# Patient Record
Sex: FEMALE | Race: WHITE | NOT HISPANIC OR LATINO | Employment: OTHER | URBAN - METROPOLITAN AREA
[De-identification: names, ages, dates, MRNs, and addresses within clinical notes are randomized per-mention and may not be internally consistent; named-entity substitution may affect disease eponyms.]

---

## 2019-03-28 ENCOUNTER — HOSPITAL ENCOUNTER (INPATIENT)
Facility: MEDICAL CENTER | Age: 84
LOS: 3 days | DRG: 872 | End: 2019-03-31
Attending: EMERGENCY MEDICINE | Admitting: HOSPITALIST
Payer: MEDICARE

## 2019-03-28 ENCOUNTER — APPOINTMENT (OUTPATIENT)
Dept: RADIOLOGY | Facility: MEDICAL CENTER | Age: 84
DRG: 872 | End: 2019-03-28
Attending: EMERGENCY MEDICINE
Payer: MEDICARE

## 2019-03-28 DIAGNOSIS — N39.0 ACUTE UTI: ICD-10-CM

## 2019-03-28 DIAGNOSIS — A41.9 SEPSIS, DUE TO UNSPECIFIED ORGANISM: ICD-10-CM

## 2019-03-28 PROBLEM — E03.9 HYPOTHYROIDISM: Status: ACTIVE | Noted: 2019-03-28

## 2019-03-28 PROBLEM — E87.1 HYPONATREMIA: Status: ACTIVE | Noted: 2019-03-28

## 2019-03-28 LAB
ALBUMIN SERPL BCP-MCNC: 4 G/DL (ref 3.2–4.9)
ALBUMIN/GLOB SERPL: 1.3 G/DL
ALP SERPL-CCNC: 74 U/L (ref 30–99)
ALT SERPL-CCNC: 21 U/L (ref 2–50)
ANION GAP SERPL CALC-SCNC: 11 MMOL/L (ref 0–11.9)
APPEARANCE UR: ABNORMAL
AST SERPL-CCNC: 30 U/L (ref 12–45)
BACTERIA #/AREA URNS HPF: ABNORMAL /HPF
BASOPHILS # BLD AUTO: 0.2 % (ref 0–1.8)
BASOPHILS # BLD: 0.03 K/UL (ref 0–0.12)
BILIRUB SERPL-MCNC: 1 MG/DL (ref 0.1–1.5)
BILIRUB UR QL STRIP.AUTO: NEGATIVE
BUN SERPL-MCNC: 12 MG/DL (ref 8–22)
CALCIUM SERPL-MCNC: 9 MG/DL (ref 8.5–10.5)
CHLORIDE SERPL-SCNC: 99 MMOL/L (ref 96–112)
CO2 SERPL-SCNC: 20 MMOL/L (ref 20–33)
COLOR UR: ABNORMAL
CREAT SERPL-MCNC: 0.68 MG/DL (ref 0.5–1.4)
EOSINOPHIL # BLD AUTO: 0 K/UL (ref 0–0.51)
EOSINOPHIL NFR BLD: 0 % (ref 0–6.9)
EPI CELLS #/AREA URNS HPF: NEGATIVE /HPF
ERYTHROCYTE [DISTWIDTH] IN BLOOD BY AUTOMATED COUNT: 48.6 FL (ref 35.9–50)
FLUAV RNA SPEC QL NAA+PROBE: NEGATIVE
FLUBV RNA SPEC QL NAA+PROBE: NEGATIVE
GLOBULIN SER CALC-MCNC: 3.2 G/DL (ref 1.9–3.5)
GLUCOSE SERPL-MCNC: 133 MG/DL (ref 65–99)
GLUCOSE UR STRIP.AUTO-MCNC: NEGATIVE MG/DL
HCT VFR BLD AUTO: 42.3 % (ref 37–47)
HGB BLD-MCNC: 14.2 G/DL (ref 12–16)
IMM GRANULOCYTES # BLD AUTO: 0.08 K/UL (ref 0–0.11)
IMM GRANULOCYTES NFR BLD AUTO: 0.5 % (ref 0–0.9)
KETONES UR STRIP.AUTO-MCNC: >=160 MG/DL
LACTATE BLD-SCNC: 1.3 MMOL/L (ref 0.5–2)
LEUKOCYTE ESTERASE UR QL STRIP.AUTO: ABNORMAL
LYMPHOCYTES # BLD AUTO: 0.64 K/UL (ref 1–4.8)
LYMPHOCYTES NFR BLD: 4.3 % (ref 22–41)
MCH RBC QN AUTO: 32.1 PG (ref 27–33)
MCHC RBC AUTO-ENTMCNC: 33.6 G/DL (ref 33.6–35)
MCV RBC AUTO: 95.7 FL (ref 81.4–97.8)
MICRO URNS: ABNORMAL
MONOCYTES # BLD AUTO: 1.37 K/UL (ref 0–0.85)
MONOCYTES NFR BLD AUTO: 9.3 % (ref 0–13.4)
NEUTROPHILS # BLD AUTO: 12.69 K/UL (ref 2–7.15)
NEUTROPHILS NFR BLD: 85.7 % (ref 44–72)
NITRITE UR QL STRIP.AUTO: NEGATIVE
NRBC # BLD AUTO: 0 K/UL
NRBC BLD-RTO: 0 /100 WBC
OSMOLALITY SERPL: 277 MOSM/KG H2O (ref 278–298)
OSMOLALITY UR: 730 MOSM/KG H2O (ref 300–900)
PH UR STRIP.AUTO: 5.5 [PH]
PLATELET # BLD AUTO: 246 K/UL (ref 164–446)
PMV BLD AUTO: 9.1 FL (ref 9–12.9)
POTASSIUM SERPL-SCNC: 3.7 MMOL/L (ref 3.6–5.5)
PROT SERPL-MCNC: 7.2 G/DL (ref 6–8.2)
PROT UR QL STRIP: 30 MG/DL
RBC # BLD AUTO: 4.42 M/UL (ref 4.2–5.4)
RBC # URNS HPF: ABNORMAL /HPF
RBC UR QL AUTO: ABNORMAL
SODIUM SERPL-SCNC: 130 MMOL/L (ref 135–145)
SP GR UR STRIP.AUTO: 1.02
UROBILINOGEN UR STRIP.AUTO-MCNC: 0.2 MG/DL
WBC # BLD AUTO: 14.8 K/UL (ref 4.8–10.8)
WBC #/AREA URNS HPF: ABNORMAL /HPF

## 2019-03-28 PROCEDURE — 83605 ASSAY OF LACTIC ACID: CPT

## 2019-03-28 PROCEDURE — 700101 HCHG RX REV CODE 250: Performed by: EMERGENCY MEDICINE

## 2019-03-28 PROCEDURE — 87040 BLOOD CULTURE FOR BACTERIA: CPT

## 2019-03-28 PROCEDURE — 71045 X-RAY EXAM CHEST 1 VIEW: CPT

## 2019-03-28 PROCEDURE — 700105 HCHG RX REV CODE 258: Performed by: HOSPITALIST

## 2019-03-28 PROCEDURE — 94640 AIRWAY INHALATION TREATMENT: CPT

## 2019-03-28 PROCEDURE — 87077 CULTURE AEROBIC IDENTIFY: CPT

## 2019-03-28 PROCEDURE — 87502 INFLUENZA DNA AMP PROBE: CPT

## 2019-03-28 PROCEDURE — 81001 URINALYSIS AUTO W/SCOPE: CPT

## 2019-03-28 PROCEDURE — 99285 EMERGENCY DEPT VISIT HI MDM: CPT

## 2019-03-28 PROCEDURE — 770006 HCHG ROOM/CARE - MED/SURG/GYN SEMI*

## 2019-03-28 PROCEDURE — 80053 COMPREHEN METABOLIC PANEL: CPT

## 2019-03-28 PROCEDURE — 87086 URINE CULTURE/COLONY COUNT: CPT

## 2019-03-28 PROCEDURE — 700102 HCHG RX REV CODE 250 W/ 637 OVERRIDE(OP): Performed by: EMERGENCY MEDICINE

## 2019-03-28 PROCEDURE — A9270 NON-COVERED ITEM OR SERVICE: HCPCS | Performed by: HOSPITALIST

## 2019-03-28 PROCEDURE — A9270 NON-COVERED ITEM OR SERVICE: HCPCS | Performed by: EMERGENCY MEDICINE

## 2019-03-28 PROCEDURE — 83935 ASSAY OF URINE OSMOLALITY: CPT

## 2019-03-28 PROCEDURE — 700105 HCHG RX REV CODE 258: Performed by: EMERGENCY MEDICINE

## 2019-03-28 PROCEDURE — 87186 SC STD MICRODIL/AGAR DIL: CPT

## 2019-03-28 PROCEDURE — 85025 COMPLETE CBC W/AUTO DIFF WBC: CPT

## 2019-03-28 PROCEDURE — 302214 INTUBATION BOX: Performed by: INTERNAL MEDICINE

## 2019-03-28 PROCEDURE — 700102 HCHG RX REV CODE 250 W/ 637 OVERRIDE(OP): Performed by: HOSPITALIST

## 2019-03-28 PROCEDURE — 700111 HCHG RX REV CODE 636 W/ 250 OVERRIDE (IP): Performed by: HOSPITALIST

## 2019-03-28 PROCEDURE — 94760 N-INVAS EAR/PLS OXIMETRY 1: CPT

## 2019-03-28 PROCEDURE — 96365 THER/PROPH/DIAG IV INF INIT: CPT

## 2019-03-28 PROCEDURE — 36415 COLL VENOUS BLD VENIPUNCTURE: CPT

## 2019-03-28 PROCEDURE — 83930 ASSAY OF BLOOD OSMOLALITY: CPT

## 2019-03-28 PROCEDURE — 99223 1ST HOSP IP/OBS HIGH 75: CPT | Mod: AI | Performed by: HOSPITALIST

## 2019-03-28 PROCEDURE — 700111 HCHG RX REV CODE 636 W/ 250 OVERRIDE (IP): Performed by: EMERGENCY MEDICINE

## 2019-03-28 RX ORDER — ONDANSETRON 4 MG/1
4 TABLET, ORALLY DISINTEGRATING ORAL EVERY 4 HOURS PRN
Status: DISCONTINUED | OUTPATIENT
Start: 2019-03-28 | End: 2019-03-31 | Stop reason: HOSPADM

## 2019-03-28 RX ORDER — BRIMONIDINE TARTRATE 1.5 MG/ML
1 SOLUTION/ DROPS OPHTHALMIC 2 TIMES DAILY
Status: DISCONTINUED | OUTPATIENT
Start: 2019-03-28 | End: 2019-03-31 | Stop reason: HOSPADM

## 2019-03-28 RX ORDER — ONDANSETRON 2 MG/ML
4 INJECTION INTRAMUSCULAR; INTRAVENOUS EVERY 4 HOURS PRN
Status: DISCONTINUED | OUTPATIENT
Start: 2019-03-28 | End: 2019-03-31 | Stop reason: HOSPADM

## 2019-03-28 RX ORDER — SODIUM CHLORIDE 9 MG/ML
2000 INJECTION, SOLUTION INTRAVENOUS ONCE
Status: COMPLETED | OUTPATIENT
Start: 2019-03-28 | End: 2019-03-28

## 2019-03-28 RX ORDER — ACETAMINOPHEN 325 MG/1
650 TABLET ORAL ONCE
Status: COMPLETED | OUTPATIENT
Start: 2019-03-28 | End: 2019-03-28

## 2019-03-28 RX ORDER — BRIMONIDINE TARTRATE 1 MG/ML
1 SOLUTION/ DROPS OPHTHALMIC 2 TIMES DAILY
COMMUNITY

## 2019-03-28 RX ORDER — LEVALBUTEROL TARTRATE 45 UG/1
1-2 AEROSOL, METERED ORAL EVERY 4 HOURS PRN
Status: ON HOLD | COMMUNITY
End: 2019-03-31

## 2019-03-28 RX ORDER — BISACODYL 10 MG
10 SUPPOSITORY, RECTAL RECTAL
Status: DISCONTINUED | OUTPATIENT
Start: 2019-03-28 | End: 2019-03-31 | Stop reason: HOSPADM

## 2019-03-28 RX ORDER — LEVOTHYROXINE SODIUM 0.07 MG/1
75 TABLET ORAL EVERY MORNING
COMMUNITY

## 2019-03-28 RX ORDER — AMOXICILLIN 250 MG
2 CAPSULE ORAL 2 TIMES DAILY
Status: DISCONTINUED | OUTPATIENT
Start: 2019-03-28 | End: 2019-03-31 | Stop reason: HOSPADM

## 2019-03-28 RX ORDER — ACETAMINOPHEN 325 MG/1
650 TABLET ORAL EVERY 6 HOURS PRN
Status: DISCONTINUED | OUTPATIENT
Start: 2019-03-28 | End: 2019-03-31 | Stop reason: HOSPADM

## 2019-03-28 RX ORDER — LEVOTHYROXINE SODIUM 0.07 MG/1
75 TABLET ORAL EVERY MORNING
Status: DISCONTINUED | OUTPATIENT
Start: 2019-03-29 | End: 2019-03-31 | Stop reason: HOSPADM

## 2019-03-28 RX ORDER — POLYETHYLENE GLYCOL 3350 17 G/17G
1 POWDER, FOR SOLUTION ORAL
Status: DISCONTINUED | OUTPATIENT
Start: 2019-03-28 | End: 2019-03-31 | Stop reason: HOSPADM

## 2019-03-28 RX ORDER — SODIUM CHLORIDE 9 MG/ML
1000 INJECTION, SOLUTION INTRAVENOUS CONTINUOUS
Status: DISCONTINUED | OUTPATIENT
Start: 2019-03-28 | End: 2019-03-28

## 2019-03-28 RX ORDER — SODIUM CHLORIDE 9 MG/ML
30 INJECTION, SOLUTION INTRAVENOUS
Status: COMPLETED | OUTPATIENT
Start: 2019-03-28 | End: 2019-03-28

## 2019-03-28 RX ORDER — SODIUM CHLORIDE 9 MG/ML
500 INJECTION, SOLUTION INTRAVENOUS
Status: DISCONTINUED | OUTPATIENT
Start: 2019-03-28 | End: 2019-03-31 | Stop reason: HOSPADM

## 2019-03-28 RX ORDER — ESTRADIOL 0.5 MG/1
0.25 TABLET ORAL DAILY
COMMUNITY

## 2019-03-28 RX ORDER — HEPARIN SODIUM 5000 [USP'U]/ML
5000 INJECTION, SOLUTION INTRAVENOUS; SUBCUTANEOUS EVERY 8 HOURS
Status: DISCONTINUED | OUTPATIENT
Start: 2019-03-28 | End: 2019-03-31 | Stop reason: HOSPADM

## 2019-03-28 RX ADMIN — SODIUM CHLORIDE 2000 ML: 9 INJECTION, SOLUTION INTRAVENOUS at 23:37

## 2019-03-28 RX ADMIN — SODIUM CHLORIDE 2004 ML: 9 INJECTION, SOLUTION INTRAVENOUS at 20:00

## 2019-03-28 RX ADMIN — SODIUM CHLORIDE 2004 ML: 9 INJECTION, SOLUTION INTRAVENOUS at 20:05

## 2019-03-28 RX ADMIN — ACETAMINOPHEN 650 MG: 325 TABLET, FILM COATED ORAL at 16:55

## 2019-03-28 RX ADMIN — BRIMONIDINE TARTRATE 1 DROP: 1.5 SOLUTION/ DROPS OPHTHALMIC at 23:08

## 2019-03-28 RX ADMIN — ACETAMINOPHEN 650 MG: 325 TABLET, FILM COATED ORAL at 23:08

## 2019-03-28 RX ADMIN — ALBUTEROL SULFATE 5 MG: 5 SOLUTION RESPIRATORY (INHALATION) at 17:19

## 2019-03-28 RX ADMIN — CEFTRIAXONE SODIUM 2 G: 2 INJECTION, POWDER, FOR SOLUTION INTRAMUSCULAR; INTRAVENOUS at 19:16

## 2019-03-28 RX ADMIN — HEPARIN SODIUM 5000 UNITS: 5000 INJECTION, SOLUTION INTRAVENOUS; SUBCUTANEOUS at 23:08

## 2019-03-28 RX ADMIN — SODIUM CHLORIDE 1000 ML: 9 INJECTION, SOLUTION INTRAVENOUS at 16:47

## 2019-03-28 ASSESSMENT — PAIN DESCRIPTION - DESCRIPTORS: DESCRIPTORS: ACHING

## 2019-03-28 ASSESSMENT — ENCOUNTER SYMPTOMS
DIZZINESS: 0
DEPRESSION: 0
SHORTNESS OF BREATH: 0
NAUSEA: 0
PALPITATIONS: 0
ABDOMINAL PAIN: 0
WHEEZING: 0
FOCAL WEAKNESS: 0
HEADACHES: 0
VOMITING: 0
DIARRHEA: 0
CHILLS: 1
TINGLING: 0
SORE THROAT: 0
MYALGIAS: 1
COUGH: 1
FEVER: 1
PHOTOPHOBIA: 0

## 2019-03-28 ASSESSMENT — LIFESTYLE VARIABLES
ALCOHOL_USE: YES
EVER FELT BAD OR GUILTY ABOUT YOUR DRINKING: NO
HOW MANY TIMES IN THE PAST YEAR HAVE YOU HAD 5 OR MORE DRINKS IN A DAY: 1
TOTAL SCORE: 0
EVER_SMOKED: YES
HAVE YOU EVER FELT YOU SHOULD CUT DOWN ON YOUR DRINKING: NO
CONSUMPTION TOTAL: POSITIVE
AVERAGE NUMBER OF DAYS PER WEEK YOU HAVE A DRINK CONTAINING ALCOHOL: 3
TOTAL SCORE: 0
ON A TYPICAL DAY WHEN YOU DRINK ALCOHOL HOW MANY DRINKS DO YOU HAVE: 1
HAVE PEOPLE ANNOYED YOU BY CRITICIZING YOUR DRINKING: NO
EVER HAD A DRINK FIRST THING IN THE MORNING TO STEADY YOUR NERVES TO GET RID OF A HANGOVER: NO
DO YOU DRINK ALCOHOL: NO
TOTAL SCORE: 0

## 2019-03-28 ASSESSMENT — COGNITIVE AND FUNCTIONAL STATUS - GENERAL
SUGGESTED CMS G CODE MODIFIER MOBILITY: CH
MOBILITY SCORE: 24
SUGGESTED CMS G CODE MODIFIER DAILY ACTIVITY: CI
DAILY ACTIVITIY SCORE: 23
TOILETING: A LITTLE

## 2019-03-28 ASSESSMENT — PATIENT HEALTH QUESTIONNAIRE - PHQ9
1. LITTLE INTEREST OR PLEASURE IN DOING THINGS: NOT AT ALL
1. LITTLE INTEREST OR PLEASURE IN DOING THINGS: NOT AT ALL
2. FEELING DOWN, DEPRESSED, IRRITABLE, OR HOPELESS: NOT AT ALL
SUM OF ALL RESPONSES TO PHQ9 QUESTIONS 1 AND 2: 0
2. FEELING DOWN, DEPRESSED, IRRITABLE, OR HOPELESS: NOT AT ALL
SUM OF ALL RESPONSES TO PHQ9 QUESTIONS 1 AND 2: 0

## 2019-03-28 NOTE — ED TRIAGE NOTES
"[facility]    Chief Complaint   Patient presents with   • Painful Urination     x3 days    • Flu Like Symptoms       Pt ambulatory to triage with above complaint. +Dysuria, frequency, urgency, odorous urine. +fever +cough +general weakness.  VSS.    Charge RN aware, Sepsis score of 3    Blood Pressure : 153/81, Pulse: (!) 112, Respiration: 18, Temperature: (!) 38.4 °C (101.1 °F), Height: 165.1 cm (5' 5\"), Weight: 66.8 kg (147 lb 4.3 oz), Pulse Oximetry: 91 %, O2 Delivery: None (Room Air)      "

## 2019-03-28 NOTE — ED NOTES
IV established, blood and first set of cultures drawn and sent to lab. Fluids infusing per ERP's orders. VSS, family at bedside and aware of POC, call light within reach, will continue to monitor.

## 2019-03-28 NOTE — ED PROVIDER NOTES
"ED Provider Note    Scribed for Lexx Kumar M.D. by John Mckeon. 3/28/2019, 4:27 PM.    Primary care provider: No primary care provider  Means of arrival: Walk In  History obtained from: Patient  History limited by: None     CHIEF COMPLAINT  Chief Complaint   Patient presents with   • Painful Urination     x3 days    • Flu Like Symptoms     HPI   Saritha Hamilton is a 84 y.o. female who presents to the Emergency Department with flu like symptoms.   The patient complains of an onset of fever and generalized body aches yesterday. She describes associated symptoms of productive cough, dysuria, and foul smelling urine. Patient describes \"green\" sputum production with her cough. Patient's symptoms have been constant since yesterday. Patient has not taken any medications to treat her symptoms and denies any alleviating factors.   Patient has a history of asthma, uses Albuterol as needed. She has been wheezing today which was improved after using Albuterol inhaler. Patient got a flu vaccine this year.     The patient denies any vomiting, diarrhea, chest pain, shortness of breath, abdominal pain, hemoptysis, or hematuria.     REVIEW OF SYSTEMS  Pertinent positives include fever, generalized body aches, productive cough, dysuria, wheezing. Pertinent negatives include vomiting, diarrhea, chest pain, shortness of breath, abdominal pain, hemoptysis, or hematuria. All other systems negative.    PAST MEDICAL HISTORY   has a past medical history of Asthma.    SURGICAL HISTORY  patient denies any surgical history    SOCIAL HISTORY  Social History   Substance Use Topics   • Smoking status: Never Smoker   • Smokeless tobacco: Never Used   • Alcohol use Yes      History   Drug Use No     FAMILY HISTORY  History reviewed. No pertinent family history.    CURRENT MEDICATIONS  Home Medications     Reviewed by Yenni Acharya (Pharmacy Tech) on 03/28/19 at 2002  Med List Status: Complete   Medication Last Dose Status " "  brimonidine (ALPHAGAN P) 0.1 % Solution 3/28/2019 Active   CALCIUM PO 3/28/2019 Active   Cyanocobalamin (VITAMIN B 12 PO) 3/28/2019 Active   estradiol (ESTRACE) 0.5 MG tablet 3/28/2019 Active   Flaxseed, Linseed, (FLAX SEED OIL PO) 3/28/2019 Active   levothyroxine (SYNTHROID) 75 MCG Tab 3/28/2019 Active   MAGNESIUM PO 3/28/2019 Active              ALLERGIES  Allergies   Allergen Reactions   • Asa [Aspirin] Hives   • Ciprofloxacin Unspecified     Asthma       PHYSICAL EXAM  VITAL SIGNS: /81   Pulse (!) 112   Temp (!) 38.4 °C (101.1 °F) (Temporal)   Resp 18   Ht 1.651 m (5' 5\")   Wt 66.8 kg (147 lb 4.3 oz)   SpO2 91%   BMI 24.51 kg/m²     Constitutional: Well developed, Well nourished, no distress.   HENT: Normocephalic, Atraumatic.  Slightly dry mucous member  Eyes: Conjunctiva normal, No discharge.    Cardiovascular: Tachycardic heart rate, Normal rhythm, No murmurs, equal pulses.   Pulmonary: Wheezing in bilateral bases. No rales or rhonchi.  No respiratory distress.   Chest: No chest wall tenderness or deformity.   Abdomen:Soft, No tenderness, No masses, no rebound, no guarding.   Back: No CVA tenderness.   Musculoskeletal: No major deformities noted, No tenderness.   Skin: Warm, Dry, No erythema, No rash.   Neurologic: Alert & oriented x 3, Normal motor function,  No focal deficits noted.   Psychiatric: Affect normal, Judgment normal, Mood normal.     LABS  Results for orders placed or performed during the hospital encounter of 03/28/19   Lactic acid (lactate)   Result Value Ref Range    Lactic Acid 1.3 0.5 - 2.0 mmol/L   CBC WITH DIFFERENTIAL   Result Value Ref Range    WBC 14.8 (H) 4.8 - 10.8 K/uL    RBC 4.42 4.20 - 5.40 M/uL    Hemoglobin 14.2 12.0 - 16.0 g/dL    Hematocrit 42.3 37.0 - 47.0 %    MCV 95.7 81.4 - 97.8 fL    MCH 32.1 27.0 - 33.0 pg    MCHC 33.6 33.6 - 35.0 g/dL    RDW 48.6 35.9 - 50.0 fL    Platelet Count 246 164 - 446 K/uL    MPV 9.1 9.0 - 12.9 fL    Neutrophils-Polys 85.70 (H) " 44.00 - 72.00 %    Lymphocytes 4.30 (L) 22.00 - 41.00 %    Monocytes 9.30 0.00 - 13.40 %    Eosinophils 0.00 0.00 - 6.90 %    Basophils 0.20 0.00 - 1.80 %    Immature Granulocytes 0.50 0.00 - 0.90 %    Nucleated RBC 0.00 /100 WBC    Neutrophils (Absolute) 12.69 (H) 2.00 - 7.15 K/uL    Lymphs (Absolute) 0.64 (L) 1.00 - 4.80 K/uL    Monos (Absolute) 1.37 (H) 0.00 - 0.85 K/uL    Eos (Absolute) 0.00 0.00 - 0.51 K/uL    Baso (Absolute) 0.03 0.00 - 0.12 K/uL    Immature Granulocytes (abs) 0.08 0.00 - 0.11 K/uL    NRBC (Absolute) 0.00 K/uL   COMP METABOLIC PANEL   Result Value Ref Range    Sodium 130 (L) 135 - 145 mmol/L    Potassium 3.7 3.6 - 5.5 mmol/L    Chloride 99 96 - 112 mmol/L    Co2 20 20 - 33 mmol/L    Anion Gap 11.0 0.0 - 11.9    Glucose 133 (H) 65 - 99 mg/dL    Bun 12 8 - 22 mg/dL    Creatinine 0.68 0.50 - 1.40 mg/dL    Calcium 9.0 8.5 - 10.5 mg/dL    AST(SGOT) 30 12 - 45 U/L    ALT(SGPT) 21 2 - 50 U/L    Alkaline Phosphatase 74 30 - 99 U/L    Total Bilirubin 1.0 0.1 - 1.5 mg/dL    Albumin 4.0 3.2 - 4.9 g/dL    Total Protein 7.2 6.0 - 8.2 g/dL    Globulin 3.2 1.9 - 3.5 g/dL    A-G Ratio 1.3 g/dL   URINALYSIS   Result Value Ref Range    Color DK Yellow     Character Cloudy (A)     Specific Gravity 1.024 <1.035    Ph 5.5 5.0 - 8.0    Glucose Negative Negative mg/dL    Ketones >=160 Negative mg/dL    Protein 30 (A) Negative mg/dL    Bilirubin Negative Negative    Urobilinogen, Urine 0.2 Negative    Nitrite Negative Negative    Leukocyte Esterase Small (A) Negative    Occult Blood Large (A) Negative    Micro Urine Req Microscopic    Influenza A/B By PCR (Adult - Flu Only)   Result Value Ref Range    Influenza virus A RNA Negative Negative    Influenza virus B, PCR Negative Negative   ESTIMATED GFR   Result Value Ref Range    GFR If African American >60 >60 mL/min/1.73 m 2    GFR If Non African American >60 >60 mL/min/1.73 m 2   URINE MICROSCOPIC (W/UA)   Result Value Ref Range    WBC  (A) /hpf    RBC 5-10 (A)  /hpf    Bacteria Many (A) None /hpf    Epithelial Cells Negative /hpf      All labs reviewed by me.    RADIOLOGY  DX-CHEST-PORTABLE (1 VIEW)   Final Result      No acute cardiopulmonary process is seen.      Atherosclerotic plaque.        The radiologist's interpretation of all radiological studies have been reviewed by me.    COURSE & MEDICAL DECISION MAKING  Pertinent Labs & Imaging studies reviewed. (See chart for details)    4:27 PM - Patient seen and examined at bedside. Patient will be treated with Proventil nebulizer, Tylenol 650 mg PO. The patient will receive IV fluids for tachycardia. PO fluids are contraindicated as patient is candidate for possible surgical process. Ordered Chest X Ray, lactic acid, Influenza, CBC, CMP, urinalysis, urine culture, blood culture to evaluate her symptoms. The differential diagnoses include but are not limited to: pneumonia, urinary tract infection, pyelonephritis, sepsis, flu.    7:53 PM Recheck: Patient re-evaluated at beside. Patient's heart rate is improved following resuscitation with IV fluids. There was a good response to IV fluids after patient reevaluation. Patient is still slightly hypotensive, and will continue to further resuscitate with fluids. Patient was updated of her lab and radiology results. We spoke about her possible admission to the hospital, for which she is agreeable.     8:17 PM Spoke with Dr. Tim, Hospitalist, about the patient's condition. Dr. Tim kindly agrees to admit the patient to the hospital.          Medical Decision Making: At this point time it appears the patient most likely has a urinary tract infection leading to sepsis.  She has been started on IV antibiotics and given fluids for her soft blood pressure.  Her lactic acid is normal.  At this point time I do not think she needs to go to the unit.  Patient did have some nonproductive cough and fever I thought she may have influenza this was negative        Disposition:  Patient will  be admitted to the hospital under the care of Dr. Tim (Hospitalist) in guarded condition.     FINAL IMPRESSION  1. Acute UTI    2. Sepsis, due to unspecified organism (HCC)          John CHAHAL (Scribe), am scribing for, and in the presence of, Lexx Kumar M.D.    Electronically signed by: John Mckeon (Scribe), 3/28/2019    ILexx M.D. personally performed the services described in this documentation, as scribed by John Mckeon in my presence, and it is both accurate and complete.    The note accurately reflects work and decisions made by me.  Lexx Kumar  3/29/2019  1:05 AM

## 2019-03-29 PROBLEM — D53.9 MACROCYTIC ANEMIA: Status: ACTIVE | Noted: 2019-03-29

## 2019-03-29 LAB
ANION GAP SERPL CALC-SCNC: 5 MMOL/L (ref 0–11.9)
BUN SERPL-MCNC: 9 MG/DL (ref 8–22)
CALCIUM SERPL-MCNC: 7.9 MG/DL (ref 8.5–10.5)
CHLORIDE SERPL-SCNC: 107 MMOL/L (ref 96–112)
CO2 SERPL-SCNC: 24 MMOL/L (ref 20–33)
CREAT SERPL-MCNC: 0.64 MG/DL (ref 0.5–1.4)
ERYTHROCYTE [DISTWIDTH] IN BLOOD BY AUTOMATED COUNT: 51.2 FL (ref 35.9–50)
GLUCOSE SERPL-MCNC: 109 MG/DL (ref 65–99)
HCT VFR BLD AUTO: 36.4 % (ref 37–47)
HGB BLD-MCNC: 11.7 G/DL (ref 12–16)
MCH RBC QN AUTO: 31.9 PG (ref 27–33)
MCHC RBC AUTO-ENTMCNC: 32.1 G/DL (ref 33.6–35)
MCV RBC AUTO: 99.2 FL (ref 81.4–97.8)
PLATELET # BLD AUTO: 194 K/UL (ref 164–446)
PMV BLD AUTO: 8.9 FL (ref 9–12.9)
POTASSIUM SERPL-SCNC: 3.7 MMOL/L (ref 3.6–5.5)
RBC # BLD AUTO: 3.67 M/UL (ref 4.2–5.4)
SODIUM SERPL-SCNC: 136 MMOL/L (ref 135–145)
WBC # BLD AUTO: 12.5 K/UL (ref 4.8–10.8)

## 2019-03-29 PROCEDURE — 36415 COLL VENOUS BLD VENIPUNCTURE: CPT

## 2019-03-29 PROCEDURE — A9270 NON-COVERED ITEM OR SERVICE: HCPCS | Performed by: HOSPITALIST

## 2019-03-29 PROCEDURE — 700111 HCHG RX REV CODE 636 W/ 250 OVERRIDE (IP): Performed by: HOSPITALIST

## 2019-03-29 PROCEDURE — 99233 SBSQ HOSP IP/OBS HIGH 50: CPT | Performed by: FAMILY MEDICINE

## 2019-03-29 PROCEDURE — 700102 HCHG RX REV CODE 250 W/ 637 OVERRIDE(OP): Performed by: HOSPITALIST

## 2019-03-29 PROCEDURE — 85027 COMPLETE CBC AUTOMATED: CPT

## 2019-03-29 PROCEDURE — 700105 HCHG RX REV CODE 258: Performed by: HOSPITALIST

## 2019-03-29 PROCEDURE — 770006 HCHG ROOM/CARE - MED/SURG/GYN SEMI*

## 2019-03-29 PROCEDURE — 80048 BASIC METABOLIC PNL TOTAL CA: CPT

## 2019-03-29 RX ADMIN — BRIMONIDINE TARTRATE 1 DROP: 1.5 SOLUTION/ DROPS OPHTHALMIC at 18:39

## 2019-03-29 RX ADMIN — HEPARIN SODIUM 5000 UNITS: 5000 INJECTION, SOLUTION INTRAVENOUS; SUBCUTANEOUS at 20:15

## 2019-03-29 RX ADMIN — ACETAMINOPHEN 650 MG: 325 TABLET, FILM COATED ORAL at 13:24

## 2019-03-29 RX ADMIN — HEPARIN SODIUM 5000 UNITS: 5000 INJECTION, SOLUTION INTRAVENOUS; SUBCUTANEOUS at 13:24

## 2019-03-29 RX ADMIN — LEVOTHYROXINE SODIUM 75 MCG: 75 TABLET ORAL at 07:53

## 2019-03-29 RX ADMIN — BRIMONIDINE TARTRATE 1 DROP: 1.5 SOLUTION/ DROPS OPHTHALMIC at 07:54

## 2019-03-29 RX ADMIN — CEFTRIAXONE SODIUM 2 G: 2 INJECTION, POWDER, FOR SOLUTION INTRAMUSCULAR; INTRAVENOUS at 20:15

## 2019-03-29 RX ADMIN — HEPARIN SODIUM 5000 UNITS: 5000 INJECTION, SOLUTION INTRAVENOUS; SUBCUTANEOUS at 07:53

## 2019-03-29 ASSESSMENT — ENCOUNTER SYMPTOMS
SPUTUM PRODUCTION: 0
DEPRESSION: 0
NAUSEA: 0
FLANK PAIN: 0
PALPITATIONS: 0
HEADACHES: 0
DIZZINESS: 0
TINGLING: 0
DOUBLE VISION: 0
ORTHOPNEA: 0
WEIGHT LOSS: 0
BACK PAIN: 0
VOMITING: 0
FEVER: 0
BLURRED VISION: 0
HEMOPTYSIS: 0
MYALGIAS: 1
BRUISES/BLEEDS EASILY: 0
ABDOMINAL PAIN: 0
PHOTOPHOBIA: 0
COUGH: 0
NECK PAIN: 0
CHILLS: 1
HEARTBURN: 0

## 2019-03-29 ASSESSMENT — LIFESTYLE VARIABLES: SUBSTANCE_ABUSE: 0

## 2019-03-29 NOTE — FLOWSHEET NOTE
03/28/19 1720   Interdisciplinary Plan of Care-Goals (Indications)   Bronchodilator Indications History / Diagnosis   Interdisciplinary Plan of Care-Outcomes    Bronchodilator Outcome Diminished Wheezing and Volume of Air Movement Increased   Education   Education Yes - Pt. / Family has been Instructed in use of Respiratory Equipment   RT Assessment of Delivered Medications   Evaluation of Medication Delivery Daily Yes-- Pt /Family has been Instructed in use of Respiratory Medications and Adverse Reactions   SVN Group   #SVN Performed Yes   Given By: Mouthpiece   Respiratory WDL   Respiratory (WDL) X   Chest Exam   Respiration 18   Pulse 91   Heart Rate (Monitored) 91   Breath Sounds   RUL Breath Sounds Expiratory Wheezes;Inspiratory Wheezes   RML Breath Sounds Expiratory Wheezes;Inspiratory Wheezes   RLL Breath Sounds Diminished   ENOCH Breath Sounds Expiratory Wheezes;Inspiratory Wheezes   LLL Breath Sounds Diminished   Oximetry   Continuous Oximetry Yes   Oxygen   Pulse Oximetry 93 %   O2 (LPM) 0   FiO2% 21 %   O2 Daily Delivery Respiratory  Room Air with O2 Available

## 2019-03-29 NOTE — PROGRESS NOTES
· 2 RN skin check complete with SONIA Zhao.  · Devices in place none.  · Skin assessed under devices yes.  · Confirmed pressure ulcers found on n/a.  · New potential pressure ulcers noted on n/a.    Pt. Skin is intact, noted a skin graft on her R medial lower leg area that is already scarred, no drainage. Area where skin for the graft was obtained is on R lateral thigh area and is discolored. Sacral area is pink and blanching.   · The following interventions in place: pt. Able to turn on her self, dri reddy pads in use. Moisturizer provided for the pt.

## 2019-03-29 NOTE — ED NOTES
Pt ambulated up to bathroom with steady gait. Provided urine sample. Repeat labs and urine sent to lab.   Pt and family aware of POC. Call light within reach.

## 2019-03-29 NOTE — ASSESSMENT & PLAN NOTE
This is likely acute cystitis.  Patient reports that she has a history of ESBL UTI several years ago that have been diagnosed in [place].  I do not have these records to confirm.  She has leukocytosis, she is febrile, and she is tachycardic.  She did have low normal blood pressures but no systolic blood pressures have been below 90.  Otherwise she has no lactic acidosis.  Her UA is positive for bacteria and leukocyte esterase. Urine culture grew E. Coli which is sensitive to current antibiotic.  Discussed urine culture with the patient.  The patient still has fever and chill this morning.  Continue current antibiotic.  Anticipate discharge on cefdinir tomorrow. Blood culture thus far negative.

## 2019-03-29 NOTE — H&P
Hospital Medicine History & Physical Note    Date of Service  3/28/2019    Primary Care Physician  No primary care provider on file.    Consultants  None    Code Status  Full    Chief Complaint  Chief Complaint   Patient presents with   • Painful Urination     x3 days    • Flu Like Symptoms       History of Presenting Illness  84 y.o. female who presented on 3/28/2019 with cough, aches, chills, and dysuria.  This is a pleasant 84-year-old female who is visiting [place] from [facility] who comes in with flulike symptoms plus dysuria.  She states that she was seen by her primary care provider back in [place] several weeks ago for dysuria however a urinalysis obtained at that time was unrevealing.  She arrived in [place] yesterday and at that time, developed body aches, chills, and a cough.  She has had no sick contacts with similar symptoms.  Today, her symptoms worsened and she also had subjective fever as well as persistence of her dysuria.  She also reports increased urgency and an episode of incontinence.  She states that she has a history of kidney stones but that this does not feel similar to her prior nephrolithiasis.  She denies any abdominal pain.  Interestingly, the patient reports that a couple of years ago she was diagnosed with ESBL UTI and had been treated with Macrodantin.  Otherwise she has had no headaches, chest pain, breathing problems, or diarrhea.    Review of Systems  Review of Systems   Constitutional: Positive for chills and fever.   HENT: Negative for congestion and sore throat.    Eyes: Negative for photophobia.   Respiratory: Positive for cough. Negative for shortness of breath and wheezing.    Cardiovascular: Negative for chest pain and palpitations.   Gastrointestinal: Negative for abdominal pain, diarrhea, nausea and vomiting.   Genitourinary: Positive for dysuria and urgency.   Musculoskeletal: Positive for myalgias.   Skin: Negative.    Neurological: Negative for dizziness, tingling,  focal weakness and headaches.   Psychiatric/Behavioral: Negative for depression and suicidal ideas.       Past Medical History  Past Medical History:   Diagnosis Date   • Asthma        Surgical History  Hysterectomy, multiple orthopedic joint surgeries including knees and shoulder.  Also extensive sinus surgery.    Family History  History reviewed. No pertinent family history.    Social History  Social History   Substance Use Topics   • Smoking status: Never Smoker   • Smokeless tobacco: Never Used   • Alcohol use Yes       Allergies  Allergies   Allergen Reactions   • Asa [Aspirin] Hives   • Ciprofloxacin Unspecified     Asthma         Medications  No current facility-administered medications on file prior to encounter.      No current outpatient prescriptions on file prior to encounter.       Physical Exam  Hemodynamics  Temp (24hrs), Av.8 °C (100.1 °F), Min:37.2 °C (99 °F), Max:38.4 °C (101.1 °F)   Temperature: 37.2 °C (99 °F)  Pulse  Av.3  Min: 91  Max: 114 Heart Rate (Monitored): 88  Blood Pressure : 153/81, NIBP: (!) 99/61      Respiratory      Respiration: 19, Pulse Oximetry: 94 %, O2 Daily Delivery Respiratory : Room Air with O2 Available     Given By:: Mouthpiece  RUL Breath Sounds: Expiratory Wheezes;Inspiratory Wheezes, RML Breath Sounds: Expiratory Wheezes;Inspiratory Wheezes, RLL Breath Sounds: Diminished, ENOCH Breath Sounds: Expiratory Wheezes;Inspiratory Wheezes, LLL Breath Sounds: Diminished    Physical Exam   Constitutional: She is oriented to person, place, and time. No distress.   HENT:   Head: Normocephalic and atraumatic.   Right Ear: External ear normal.   Left Ear: External ear normal.   Eyes: EOM are normal. Right eye exhibits no discharge. Left eye exhibits no discharge.   Neck: Neck supple. No JVD present.   Cardiovascular: Normal rate, regular rhythm and normal heart sounds.    Pulmonary/Chest: Effort normal and breath sounds normal. No respiratory distress. She exhibits no  tenderness.   Abdominal: Soft. Bowel sounds are normal. She exhibits no distension. There is no tenderness.   Musculoskeletal: Normal range of motion. She exhibits no edema.   Neurological: She is alert and oriented to person, place, and time. No cranial nerve deficit.   Skin: Skin is warm and dry. She is not diaphoretic. No erythema.   Psychiatric: She has a normal mood and affect. Her behavior is normal.   Nursing note and vitals reviewed.    Capillary refill less than 3 seconds, distal pulses intact    Laboratory:  Recent Labs      03/28/19   1645   WBC  14.8*   RBC  4.42   HEMOGLOBIN  14.2   HEMATOCRIT  42.3   MCV  95.7   MCH  32.1   MCHC  33.6   RDW  48.6   PLATELETCT  246   MPV  9.1     Recent Labs      03/28/19   1645   SODIUM  130*   POTASSIUM  3.7   CHLORIDE  99   CO2  20   GLUCOSE  133*   BUN  12   CREATININE  0.68   CALCIUM  9.0     Recent Labs      03/28/19   1645   ALTSGPT  21   ASTSGOT  30   ALKPHOSPHAT  74   TBILIRUBIN  1.0   GLUCOSE  133*                 No results found for: TROPONINI    Imaging  Dx-chest-portable (1 View)    Result Date: 3/28/2019  3/28/2019 4:03 PM HISTORY/REASON FOR EXAM:  Flulike symptoms. TECHNIQUE/EXAM DESCRIPTION AND NUMBER OF VIEWS: Single portable view of the chest. COMPARISON: None FINDINGS: The heart is not enlarged. Atherosclerotic calcification is seen.  No focal consolidation, pleural effusion or pneumothorax is identified.  Costophrenic angles are clear.     No acute cardiopulmonary process is seen. Atherosclerotic plaque.        Assessment/Plan:  Anticipate that patient will need greater than 2 midnights for management of the discussed medical issues.    * UTI (urinary tract infection)   Assessment & Plan    Patient reports that she has a history of ESBL UTI several years ago that have been diagnosed in [place].  I do not have these records to confirm.  She has leukocytosis, she is febrile, and she is tachycardic.  She did have low normal blood pressures but no  systolic blood pressures have been below 90.  Otherwise she has no lactic acidosis.  Her UA is positive for bacteria and leukocyte esterase.  I will start her on empiric antibiotics with IV ceftriaxone and we will wait speciation and sensitivities and urine culture.     Sepsis (HCC)   Assessment & Plan    No evidence of endorgan damage, treatment as noted above.     Hyponatremia   Assessment & Plan    Suspect due to infection, no mental status changes.  Check urine and serum osmolality levels, provide IV fluids overnight and repeat chemistries in the morning.     Hypothyroidism   Assessment & Plan    This is chronic, continue home Synthroid.         Prophylaxis: Heparin for DVT prophylaxis, no PPI indicated, bowel protocol as needed

## 2019-03-29 NOTE — PROGRESS NOTES
Received pt. From [facility], with adm dx of UTI. Pt. Welcomed to the unit, informed of unit policies and procedures. Pt. Is alert and orientedx4. Complaints of mild headache upon admission, noted to have minimal expiratory wheezes that per pt. It is because of asthma. Pt noted to have mild non productive cough, sats are above 95%. Pt. Noted to have minimal tremors also, she stated she does sometimes have this when she gets Albuterol, pt. Monitored and hooked to oxygen for supportive measures. Pt. Also hooked to pulse oximeter to monitor sats. Pt. Able to tolerate ambulating to the bathroom no equipment needed. IV on R FA intact, hooked the 2nd bag of bolus pending per order of MD. Due meds given and tolerated. Needs attended.

## 2019-03-29 NOTE — PROGRESS NOTES
[facility] Medicine Daily Progress Note    Date of Service  3/29/2019    Chief Complaint  84 y.o. female admitted 3/28/2019 with dysuria, chills and body aches.     Hospital Course    This is a 84 years old female with no significant past medical history comes in with dysuria, urgency, body aches, and chills.  Patient is visiting from [place] for skiing.  She has a history of UTI with ESBL bacteria that was treated with Macrobid in the past.  In the ER she met the sepsis criteria with no endorgan damage.  Felt to be secondary to urinary tract infection.  Influenza screen was negative.  Was started on broad-spectrum IV antibiotics.  Urine was sent for culture.    Interval Problem Update  Laying in bed comfortably.  Dysuria has improved.  Denies any flank pain.  Afebrile overnight.  Tolerating p.o. fairly.  No issues overnight per staff.    Consultants/Specialty  None    Code Status  Full code    Disposition  Likely home once medically cleared.    Review of Systems  Review of Systems   Constitutional: Positive for chills. Negative for fever and weight loss.   HENT: Negative for ear pain, hearing loss and tinnitus.    Eyes: Negative for blurred vision, double vision and photophobia.   Respiratory: Negative for cough, hemoptysis and sputum production.    Cardiovascular: Negative for chest pain, palpitations and orthopnea.   Gastrointestinal: Negative for abdominal pain, heartburn, nausea and vomiting.   Genitourinary: Positive for dysuria, frequency and urgency. Negative for flank pain and hematuria.   Musculoskeletal: Positive for myalgias. Negative for back pain and neck pain.   Skin: Negative for rash.   Neurological: Negative for dizziness, tingling and headaches.   Endo/Heme/Allergies: Does not bruise/bleed easily.   Psychiatric/Behavioral: Negative for depression, substance abuse and suicidal ideas.        Physical Exam  Temp:  [36.5 °C (97.7 °F)-38.4 °C (101.1 °F)] 37.9 °C (100.3 °F)  Pulse:  [] 99  Resp:   [16-35] 17  BP: (115-153)/(57-81) 126/79  SpO2:  [90 %-98 %] 95 %    Physical Exam   Constitutional: She is oriented to person, place, and time. No distress.   HENT:   Head: Normocephalic and atraumatic.   Mouth/Throat: No oropharyngeal exudate.   Eyes: Pupils are equal, round, and reactive to light. Right eye exhibits no discharge. Left eye exhibits no discharge. No scleral icterus.   Neck: Normal range of motion. No tracheal deviation present. No thyromegaly present.   Cardiovascular: Normal rate and regular rhythm.  Exam reveals no gallop and no friction rub.    Pulmonary/Chest: Effort normal. No respiratory distress. She has no wheezes.   Abdominal: Soft. She exhibits no distension. There is no tenderness. There is no CVA tenderness.   Musculoskeletal: She exhibits no tenderness or deformity.   Neurological: She is alert and oriented to person, place, and time. No cranial nerve deficit.   Skin: Skin is warm and dry. She is not diaphoretic.   Psychiatric: She has a normal mood and affect.       Fluids    Intake/Output Summary (Last 24 hours) at 03/29/19 1454  Last data filed at 03/29/19 1000   Gross per 24 hour   Intake              558 ml   Output                0 ml   Net              558 ml       Laboratory  Recent Labs      03/28/19   1645  03/29/19   0336   WBC  14.8*  12.5*   RBC  4.42  3.67*   HEMOGLOBIN  14.2  11.7*   HEMATOCRIT  42.3  36.4*   MCV  95.7  99.2*   MCH  32.1  31.9   MCHC  33.6  32.1*   RDW  48.6  51.2*   PLATELETCT  246  194   MPV  9.1  8.9*     Recent Labs      03/28/19   1645  03/29/19   0336   SODIUM  130*  136   POTASSIUM  3.7  3.7   CHLORIDE  99  107   CO2  20  24   GLUCOSE  133*  109*   BUN  12  9   CREATININE  0.68  0.64   CALCIUM  9.0  7.9*                   Imaging  DX-CHEST-PORTABLE (1 VIEW)   Final Result      No acute cardiopulmonary process is seen.      Atherosclerotic plaque.           Assessment/Plan  * UTI (urinary tract infection)   Assessment & Plan    This is likely acute  cystitis.  Patient reports that she has a history of ESBL UTI several years ago that have been diagnosed in [place].  I do not have these records to confirm.  She has leukocytosis, she is febrile, and she is tachycardic.  She did have low normal blood pressures but no systolic blood pressures have been below 90.  Otherwise she has no lactic acidosis.  Her UA is positive for bacteria and leukocyte esterase.  I will start her on empiric antibiotics with IV ceftriaxone and we will wait speciation and sensitivities and urine culture.  If urine culture grew ESBL bacteria, can be considered for Macrobid if sensitive for or 2 doses of fosfomycin 3 days apart.     Sepsis (HCC)   Assessment & Plan    No evidence of endorgan damage, treatment as noted above.     Macrocytic anemia   Assessment & Plan    Check folate, vit b12  Monitor HH      Hyponatremia   Assessment & Plan    Suspect due to infection, no mental status changes.    Improved      Hypothyroidism   Assessment & Plan    This is chronic, continue home Synthroid.     Plan of care discussed with multidisciplinary team during rounds.    VTE prophylaxis: Heparin

## 2019-03-29 NOTE — CARE PLAN
Problem: Venous Thromboembolism (VTW)/Deep Vein Thrombosis (DVT) Prevention:  Goal: Patient will participate in Venous Thrombosis (VTE)/Deep Vein Thrombosis (DVT)Prevention Measures  Outcome: PROGRESSING AS EXPECTED   03/28/19 2329   OTHER   Pharmacologic Prophylaxis Used Unfractionated Heparin       Problem: Knowledge Deficit  Goal: Knowledge of disease process/condition, treatment plan, diagnostic tests, and medications will improve  Outcome: PROGRESSING AS EXPECTED  POC discussed with the pt. Answered pt. Questions regarding plan for IV abx.     Problem: Pain Management  Goal: Pain level will decrease to patient's comfort goal  Outcome: PROGRESSING AS EXPECTED   03/28/19 2329   OTHER   Pain Rating Scale (NPRS) 5   Comfort Goal Comfort at Rest;Comfort with Movement     Pt. Complaining of headache, PRN tylenol given to alleviate pain.

## 2019-03-29 NOTE — ED NOTES
Med rec complete per pt at bedside  Ok per Pt to discuss medications with visitor/s present  Allergies have been verified and updated  No oral ABX within the last 30 days  Pt [facility]

## 2019-03-29 NOTE — CARE PLAN
Problem: Discharge Barriers/Planning  Goal: Patient's continuum of care needs will be met    Intervention: Involve patient and significant other/support system in setting and prioritizing goals for hospital stay and discharge  Discussed home oxygen need with pt and daughter       Problem: Respiratory:  Goal: Respiratory status will improve    Intervention: Administer and titrate oxygen therapy  O2 requires titration with ambulation; O2 eval done in order to establish need

## 2019-03-29 NOTE — ED NOTES
Report called to receiving RN. Pt and family made aware of updates to POC and room assignment. Deny any further needs or concerns at this time.

## 2019-03-30 PROBLEM — J45.909 ASTHMA: Status: ACTIVE | Noted: 2019-03-30

## 2019-03-30 LAB
ALBUMIN SERPL BCP-MCNC: 3.2 G/DL (ref 3.2–4.9)
ALBUMIN/GLOB SERPL: 1.1 G/DL
ALP SERPL-CCNC: 106 U/L (ref 30–99)
ALT SERPL-CCNC: 51 U/L (ref 2–50)
ANION GAP SERPL CALC-SCNC: 8 MMOL/L (ref 0–11.9)
AST SERPL-CCNC: 62 U/L (ref 12–45)
BACTERIA UR CULT: ABNORMAL
BACTERIA UR CULT: ABNORMAL
BASOPHILS # BLD AUTO: 0.4 % (ref 0–1.8)
BASOPHILS # BLD: 0.06 K/UL (ref 0–0.12)
BILIRUB SERPL-MCNC: 0.8 MG/DL (ref 0.1–1.5)
BUN SERPL-MCNC: 6 MG/DL (ref 8–22)
CALCIUM SERPL-MCNC: 8.2 MG/DL (ref 8.5–10.5)
CHLORIDE SERPL-SCNC: 104 MMOL/L (ref 96–112)
CO2 SERPL-SCNC: 22 MMOL/L (ref 20–33)
CREAT SERPL-MCNC: 0.63 MG/DL (ref 0.5–1.4)
EOSINOPHIL # BLD AUTO: 0.1 K/UL (ref 0–0.51)
EOSINOPHIL NFR BLD: 0.7 % (ref 0–6.9)
ERYTHROCYTE [DISTWIDTH] IN BLOOD BY AUTOMATED COUNT: 50.4 FL (ref 35.9–50)
FOLATE SERPL-MCNC: 9.5 NG/ML
GLOBULIN SER CALC-MCNC: 3 G/DL (ref 1.9–3.5)
GLUCOSE SERPL-MCNC: 108 MG/DL (ref 65–99)
HCT VFR BLD AUTO: 37.6 % (ref 37–47)
HGB BLD-MCNC: 12 G/DL (ref 12–16)
IMM GRANULOCYTES # BLD AUTO: 0.06 K/UL (ref 0–0.11)
IMM GRANULOCYTES NFR BLD AUTO: 0.4 % (ref 0–0.9)
LYMPHOCYTES # BLD AUTO: 1.17 K/UL (ref 1–4.8)
LYMPHOCYTES NFR BLD: 8 % (ref 22–41)
MCH RBC QN AUTO: 31.4 PG (ref 27–33)
MCHC RBC AUTO-ENTMCNC: 31.9 G/DL (ref 33.6–35)
MCV RBC AUTO: 98.4 FL (ref 81.4–97.8)
MONOCYTES # BLD AUTO: 1.43 K/UL (ref 0–0.85)
MONOCYTES NFR BLD AUTO: 9.8 % (ref 0–13.4)
NEUTROPHILS # BLD AUTO: 11.8 K/UL (ref 2–7.15)
NEUTROPHILS NFR BLD: 80.7 % (ref 44–72)
NRBC # BLD AUTO: 0 K/UL
NRBC BLD-RTO: 0 /100 WBC
PLATELET # BLD AUTO: 218 K/UL (ref 164–446)
PMV BLD AUTO: 9.2 FL (ref 9–12.9)
POTASSIUM SERPL-SCNC: 3.6 MMOL/L (ref 3.6–5.5)
PROT SERPL-MCNC: 6.2 G/DL (ref 6–8.2)
RBC # BLD AUTO: 3.82 M/UL (ref 4.2–5.4)
SIGNIFICANT IND 70042: ABNORMAL
SITE SITE: ABNORMAL
SODIUM SERPL-SCNC: 134 MMOL/L (ref 135–145)
SOURCE SOURCE: ABNORMAL
VIT B12 SERPL-MCNC: >1500 PG/ML (ref 211–911)
WBC # BLD AUTO: 14.6 K/UL (ref 4.8–10.8)

## 2019-03-30 PROCEDURE — 770006 HCHG ROOM/CARE - MED/SURG/GYN SEMI*

## 2019-03-30 PROCEDURE — 700105 HCHG RX REV CODE 258: Performed by: HOSPITALIST

## 2019-03-30 PROCEDURE — 700102 HCHG RX REV CODE 250 W/ 637 OVERRIDE(OP): Performed by: HOSPITALIST

## 2019-03-30 PROCEDURE — 82746 ASSAY OF FOLIC ACID SERUM: CPT

## 2019-03-30 PROCEDURE — 85025 COMPLETE CBC W/AUTO DIFF WBC: CPT

## 2019-03-30 PROCEDURE — 700102 HCHG RX REV CODE 250 W/ 637 OVERRIDE(OP): Performed by: INTERNAL MEDICINE

## 2019-03-30 PROCEDURE — 99232 SBSQ HOSP IP/OBS MODERATE 35: CPT | Performed by: INTERNAL MEDICINE

## 2019-03-30 PROCEDURE — 94760 N-INVAS EAR/PLS OXIMETRY 1: CPT

## 2019-03-30 PROCEDURE — 80053 COMPREHEN METABOLIC PANEL: CPT

## 2019-03-30 PROCEDURE — 82607 VITAMIN B-12: CPT

## 2019-03-30 PROCEDURE — 700111 HCHG RX REV CODE 636 W/ 250 OVERRIDE (IP): Performed by: HOSPITALIST

## 2019-03-30 PROCEDURE — A9270 NON-COVERED ITEM OR SERVICE: HCPCS | Performed by: HOSPITALIST

## 2019-03-30 PROCEDURE — A9270 NON-COVERED ITEM OR SERVICE: HCPCS | Performed by: INTERNAL MEDICINE

## 2019-03-30 PROCEDURE — 94640 AIRWAY INHALATION TREATMENT: CPT

## 2019-03-30 PROCEDURE — 700111 HCHG RX REV CODE 636 W/ 250 OVERRIDE (IP): Performed by: INTERNAL MEDICINE

## 2019-03-30 PROCEDURE — 36415 COLL VENOUS BLD VENIPUNCTURE: CPT

## 2019-03-30 PROCEDURE — 700101 HCHG RX REV CODE 250: Performed by: INTERNAL MEDICINE

## 2019-03-30 RX ORDER — LEVALBUTEROL INHALATION SOLUTION 0.63 MG/3ML
0.63 SOLUTION RESPIRATORY (INHALATION)
Status: DISCONTINUED | OUTPATIENT
Start: 2019-03-30 | End: 2019-03-30

## 2019-03-30 RX ORDER — LEVALBUTEROL INHALATION SOLUTION 0.63 MG/3ML
0.63 SOLUTION RESPIRATORY (INHALATION)
Status: DISCONTINUED | OUTPATIENT
Start: 2019-03-30 | End: 2019-03-31 | Stop reason: HOSPADM

## 2019-03-30 RX ORDER — ALBUTEROL SULFATE 90 UG/1
2 AEROSOL, METERED RESPIRATORY (INHALATION) PRN
Status: DISCONTINUED | OUTPATIENT
Start: 2019-03-30 | End: 2019-03-30

## 2019-03-30 RX ORDER — LACTOBACILLUS RHAMNOSUS GG 10B CELL
1 CAPSULE ORAL 2 TIMES DAILY
Status: DISCONTINUED | OUTPATIENT
Start: 2019-03-30 | End: 2019-03-31 | Stop reason: HOSPADM

## 2019-03-30 RX ORDER — BUDESONIDE 0.5 MG/2ML
0.5 INHALANT ORAL
Status: DISCONTINUED | OUTPATIENT
Start: 2019-03-30 | End: 2019-03-31 | Stop reason: HOSPADM

## 2019-03-30 RX ADMIN — ACETAMINOPHEN 650 MG: 325 TABLET, FILM COATED ORAL at 14:20

## 2019-03-30 RX ADMIN — BRIMONIDINE TARTRATE 1 DROP: 1.5 SOLUTION/ DROPS OPHTHALMIC at 04:04

## 2019-03-30 RX ADMIN — BRIMONIDINE TARTRATE 1 DROP: 1.5 SOLUTION/ DROPS OPHTHALMIC at 22:00

## 2019-03-30 RX ADMIN — CEFTRIAXONE SODIUM 2 G: 2 INJECTION, POWDER, FOR SOLUTION INTRAMUSCULAR; INTRAVENOUS at 21:51

## 2019-03-30 RX ADMIN — ACETAMINOPHEN 650 MG: 325 TABLET, FILM COATED ORAL at 03:21

## 2019-03-30 RX ADMIN — HEPARIN SODIUM 5000 UNITS: 5000 INJECTION, SOLUTION INTRAVENOUS; SUBCUTANEOUS at 04:05

## 2019-03-30 RX ADMIN — LEVALBUTEROL HYDROCHLORIDE 0.63 MG: 0.63 SOLUTION RESPIRATORY (INHALATION) at 19:18

## 2019-03-30 RX ADMIN — BUDESONIDE 0.5 MG: 0.5 SUSPENSION RESPIRATORY (INHALATION) at 19:18

## 2019-03-30 RX ADMIN — ACETAMINOPHEN 650 MG: 325 TABLET, FILM COATED ORAL at 21:52

## 2019-03-30 RX ADMIN — LEVALBUTEROL HYDROCHLORIDE 0.63 MG: 0.63 SOLUTION RESPIRATORY (INHALATION) at 15:50

## 2019-03-30 RX ADMIN — LEVOTHYROXINE SODIUM 75 MCG: 75 TABLET ORAL at 04:05

## 2019-03-30 RX ADMIN — Medication 1 CAPSULE: at 21:53

## 2019-03-30 ASSESSMENT — ENCOUNTER SYMPTOMS
FEVER: 0
MYALGIAS: 1
VOMITING: 0
PHOTOPHOBIA: 0
HEADACHES: 0
BRUISES/BLEEDS EASILY: 0
DIZZINESS: 0
BLURRED VISION: 0
ABDOMINAL PAIN: 0
PALPITATIONS: 0
HEMOPTYSIS: 0
DEPRESSION: 0
COUGH: 0
NAUSEA: 0
HEARTBURN: 0
SPUTUM PRODUCTION: 0
ORTHOPNEA: 0
WEIGHT LOSS: 0
TINGLING: 0
NECK PAIN: 0
BACK PAIN: 0
FLANK PAIN: 0
DOUBLE VISION: 0
CHILLS: 1

## 2019-03-30 ASSESSMENT — COPD QUESTIONNAIRES
HAVE YOU SMOKED AT LEAST 100 CIGARETTES IN YOUR ENTIRE LIFE: YES
DURING THE PAST 4 WEEKS HOW MUCH DID YOU FEEL SHORT OF BREATH: NONE/LITTLE OF THE TIME
DO YOU EVER COUGH UP ANY MUCUS OR PHLEGM?: YES, A FEW DAYS A WEEK OR MONTH
COPD SCREENING SCORE: 5

## 2019-03-30 ASSESSMENT — LIFESTYLE VARIABLES
EVER_SMOKED: YES
SUBSTANCE_ABUSE: 0

## 2019-03-30 NOTE — PROGRESS NOTES
Bedside report received. Pt is A&Ox4. Pt is tolerating IV antibiotics well. POC discussed with pt; all questions answered at this time.

## 2019-03-30 NOTE — CARE PLAN
Problem: Safety  Goal: Will remain free from falls  Outcome: PROGRESSING AS EXPECTED  Fall precautions in place. Bed in lowest position. Non-skid socks in place. Personal possessions within reach. Mobility sign on door.  Call light within reach. Pt educated regarding fall prevention and states understanding.     Problem: Knowledge Deficit  Goal: Knowledge of disease process/condition, treatment plan, diagnostic tests, and medications will improve  Outcome: PROGRESSING AS EXPECTED  Pt educated regarding plan of care and medications. All questions answered.

## 2019-03-30 NOTE — CARE PLAN
Problem: Bronchoconstriction:  Goal: Improve in air movement and diminished wheezing  Outcome: PROGRESSING AS EXPECTED      Respiratory Update    Treatment modality:     Xopenex Q6    Pt tolerating current treatments well with no adverse reactions.

## 2019-03-30 NOTE — PROGRESS NOTES
[facility] Medicine Daily Progress Note    Date of Service  3/30/2019    Chief Complaint  84 y.o. female admitted 3/28/2019 with dysuria, chills and body aches.     Hospital Course    This is a 84 years old female with no significant past medical history comes in with dysuria, urgency, body aches, and chills.  Patient is visiting from [place] for skiing.  She has a history of UTI with ESBL bacteria that was treated with Macrobid in the past.  In the ER she met the sepsis criteria with no endorgan damage.  Felt to be secondary to urinary tract infection.  Influenza screen was negative.  Was started on broad-spectrum IV antibiotics.  Urine was sent for culture.    Interval Problem Update  3/30:  The patient is sitting up chair and enjoying her meals.  However, she shakes periodically and stating that she had mild fever earlier in morning. Also the patient reports nonproductive coughs which started since arrival in [facility].    Consultants/Specialty  None    Code Status  Full code    Disposition  Likely home once medically cleared.    Review of Systems  Review of Systems   Constitutional: Positive for chills. Negative for fever and weight loss.   HENT: Negative for ear pain, hearing loss and tinnitus.    Eyes: Negative for blurred vision, double vision and photophobia.   Respiratory: Negative for cough, hemoptysis and sputum production.    Cardiovascular: Negative for chest pain, palpitations and orthopnea.   Gastrointestinal: Negative for abdominal pain, heartburn, nausea and vomiting.   Genitourinary: Positive for frequency. Negative for dysuria, flank pain, hematuria and urgency.   Musculoskeletal: Positive for myalgias. Negative for back pain and neck pain.   Skin: Negative for rash.   Neurological: Negative for dizziness, tingling and headaches.   Endo/Heme/Allergies: Does not bruise/bleed easily.   Psychiatric/Behavioral: Negative for depression, substance abuse and suicidal ideas.        Physical Exam  Temp:   [36.1 °C (97 °F)-37.2 °C (99 °F)] 36.1 °C (97 °F)  Pulse:  [79-96] 79  Resp:  [16-18] 16  BP: (130-146)/(54-81) 143/81  SpO2:  [91 %-97 %] 97 %    Physical Exam   Constitutional: She is oriented to person, place, and time. No distress.   HENT:   Head: Normocephalic and atraumatic.   Mouth/Throat: Oropharynx is clear and moist. No oropharyngeal exudate.   Eyes: Pupils are equal, round, and reactive to light. Right eye exhibits no discharge. Left eye exhibits no discharge. No scleral icterus.   Neck: Normal range of motion. No tracheal deviation present. No thyromegaly present.   Cardiovascular: Normal rate and regular rhythm.  Exam reveals no gallop and no friction rub.    Pulmonary/Chest: Effort normal. No respiratory distress. She has no wheezes.   Coarse bronchial breathsounds.   Abdominal: Soft. She exhibits no distension. There is no tenderness. There is no CVA tenderness.   Musculoskeletal: She exhibits edema. She exhibits no tenderness or deformity.   Neurological: She is alert and oriented to person, place, and time. No cranial nerve deficit.   Skin: Skin is warm and dry. She is not diaphoretic.   Psychiatric: She has a normal mood and affect.       Fluids    Intake/Output Summary (Last 24 hours) at 03/30/19 1538  Last data filed at 03/30/19 0830   Gross per 24 hour   Intake             1460 ml   Output                0 ml   Net             1460 ml       Laboratory  Recent Labs      03/28/19   1645  03/29/19   0336  03/30/19   0321   WBC  14.8*  12.5*  14.6*   RBC  4.42  3.67*  3.82*   HEMOGLOBIN  14.2  11.7*  12.0   HEMATOCRIT  42.3  36.4*  37.6   MCV  95.7  99.2*  98.4*   MCH  32.1  31.9  31.4   MCHC  33.6  32.1*  31.9*   RDW  48.6  51.2*  50.4*   PLATELETCT  246  194  218   MPV  9.1  8.9*  9.2     Recent Labs      03/28/19   1645  03/29/19   0336  03/30/19   0321   SODIUM  130*  136  134*   POTASSIUM  3.7  3.7  3.6   CHLORIDE  99  107  104   CO2  20  24  22   GLUCOSE  133*  109*  108*   BUN  12  9  6*    CREATININE  0.68  0.64  0.63   CALCIUM  9.0  7.9*  8.2*                   Imaging  DX-CHEST-PORTABLE (1 VIEW)   Final Result      No acute cardiopulmonary process is seen.      Atherosclerotic plaque.           Assessment/Plan  * UTI (urinary tract infection)   Assessment & Plan    This is likely acute cystitis.  Patient reports that she has a history of ESBL UTI several years ago that have been diagnosed in [place].  I do not have these records to confirm.  She has leukocytosis, she is febrile, and she is tachycardic.  She did have low normal blood pressures but no systolic blood pressures have been below 90.  Otherwise she has no lactic acidosis.  Her UA is positive for bacteria and leukocyte esterase. Urine culture grew E. Coli which is sensitive to current antibiotic.  Discussed urine culture with the patient.  The patient still has fever and chill this morning.  Continue current antibiotic.  Anticipate discharge on cefdinir tomorrow. Blood culture thus far negative.     Sepsis (HCC)   Assessment & Plan    Resolving. Vitals improving.  No evidence of endorgan damage, treatment as noted above.     Asthma   Assessment & Plan    Coarse central breath sounds.   Add xopenex and pulmicort.   The patient has remote history tobacco use.   Continue to monitor respiratory function.     Macrocytic anemia   Assessment & Plan    adequate folate, vit b12  Monitor HH      Hyponatremia   Assessment & Plan    Suspect due to infection, no mental status changes.    Improved.  Continue to monitor.     Hypothyroidism   Assessment & Plan    This is chronic, continue home Synthroid.         VTE prophylaxis: Heparin

## 2019-03-31 VITALS
SYSTOLIC BLOOD PRESSURE: 146 MMHG | OXYGEN SATURATION: 98 % | DIASTOLIC BLOOD PRESSURE: 75 MMHG | HEIGHT: 65 IN | RESPIRATION RATE: 16 BRPM | HEART RATE: 91 BPM | TEMPERATURE: 98.2 F | BODY MASS INDEX: 24.32 KG/M2 | WEIGHT: 145.94 LBS

## 2019-03-31 PROBLEM — E87.1 HYPONATREMIA: Status: RESOLVED | Noted: 2019-03-28 | Resolved: 2019-03-31

## 2019-03-31 PROBLEM — A41.9 SEPSIS (HCC): Status: RESOLVED | Noted: 2019-03-28 | Resolved: 2019-03-31

## 2019-03-31 LAB
ALBUMIN SERPL BCP-MCNC: 3.1 G/DL (ref 3.2–4.9)
BASOPHILS # BLD AUTO: 0.4 % (ref 0–1.8)
BASOPHILS # BLD: 0.05 K/UL (ref 0–0.12)
BUN SERPL-MCNC: 8 MG/DL (ref 8–22)
CALCIUM SERPL-MCNC: 8.3 MG/DL (ref 8.5–10.5)
CHLORIDE SERPL-SCNC: 103 MMOL/L (ref 96–112)
CO2 SERPL-SCNC: 25 MMOL/L (ref 20–33)
CREAT SERPL-MCNC: 0.64 MG/DL (ref 0.5–1.4)
EOSINOPHIL # BLD AUTO: 0.21 K/UL (ref 0–0.51)
EOSINOPHIL NFR BLD: 1.7 % (ref 0–6.9)
ERYTHROCYTE [DISTWIDTH] IN BLOOD BY AUTOMATED COUNT: 49.3 FL (ref 35.9–50)
GLUCOSE SERPL-MCNC: 106 MG/DL (ref 65–99)
HCT VFR BLD AUTO: 34 % (ref 37–47)
HGB BLD-MCNC: 11.1 G/DL (ref 12–16)
IMM GRANULOCYTES # BLD AUTO: 0.05 K/UL (ref 0–0.11)
IMM GRANULOCYTES NFR BLD AUTO: 0.4 % (ref 0–0.9)
LYMPHOCYTES # BLD AUTO: 1.27 K/UL (ref 1–4.8)
LYMPHOCYTES NFR BLD: 10.3 % (ref 22–41)
MCH RBC QN AUTO: 32 PG (ref 27–33)
MCHC RBC AUTO-ENTMCNC: 32.6 G/DL (ref 33.6–35)
MCV RBC AUTO: 98 FL (ref 81.4–97.8)
MONOCYTES # BLD AUTO: 1.27 K/UL (ref 0–0.85)
MONOCYTES NFR BLD AUTO: 10.3 % (ref 0–13.4)
NEUTROPHILS # BLD AUTO: 9.45 K/UL (ref 2–7.15)
NEUTROPHILS NFR BLD: 76.9 % (ref 44–72)
NRBC # BLD AUTO: 0 K/UL
NRBC BLD-RTO: 0 /100 WBC
PHOSPHATE SERPL-MCNC: 2.7 MG/DL (ref 2.5–4.5)
PLATELET # BLD AUTO: 238 K/UL (ref 164–446)
PMV BLD AUTO: 9.6 FL (ref 9–12.9)
POTASSIUM SERPL-SCNC: 3.6 MMOL/L (ref 3.6–5.5)
RBC # BLD AUTO: 3.47 M/UL (ref 4.2–5.4)
SODIUM SERPL-SCNC: 134 MMOL/L (ref 135–145)
WBC # BLD AUTO: 12.3 K/UL (ref 4.8–10.8)

## 2019-03-31 PROCEDURE — 700101 HCHG RX REV CODE 250: Performed by: INTERNAL MEDICINE

## 2019-03-31 PROCEDURE — A9270 NON-COVERED ITEM OR SERVICE: HCPCS | Performed by: HOSPITALIST

## 2019-03-31 PROCEDURE — 94640 AIRWAY INHALATION TREATMENT: CPT

## 2019-03-31 PROCEDURE — 94760 N-INVAS EAR/PLS OXIMETRY 1: CPT

## 2019-03-31 PROCEDURE — 700102 HCHG RX REV CODE 250 W/ 637 OVERRIDE(OP): Performed by: INTERNAL MEDICINE

## 2019-03-31 PROCEDURE — 85025 COMPLETE CBC W/AUTO DIFF WBC: CPT

## 2019-03-31 PROCEDURE — 80069 RENAL FUNCTION PANEL: CPT

## 2019-03-31 PROCEDURE — 36415 COLL VENOUS BLD VENIPUNCTURE: CPT

## 2019-03-31 PROCEDURE — 99239 HOSP IP/OBS DSCHRG MGMT >30: CPT | Performed by: INTERNAL MEDICINE

## 2019-03-31 PROCEDURE — 700102 HCHG RX REV CODE 250 W/ 637 OVERRIDE(OP): Performed by: HOSPITALIST

## 2019-03-31 PROCEDURE — A9270 NON-COVERED ITEM OR SERVICE: HCPCS | Performed by: INTERNAL MEDICINE

## 2019-03-31 PROCEDURE — 700111 HCHG RX REV CODE 636 W/ 250 OVERRIDE (IP): Performed by: INTERNAL MEDICINE

## 2019-03-31 RX ORDER — PREDNISONE 20 MG/1
20 TABLET ORAL DAILY
Qty: 5 TAB | Refills: 0 | Status: SHIPPED | OUTPATIENT
Start: 2019-03-31 | End: 2019-03-31

## 2019-03-31 RX ORDER — CEFDINIR 300 MG/1
300 CAPSULE ORAL 2 TIMES DAILY
Qty: 10 CAP | Refills: 0 | Status: SHIPPED | OUTPATIENT
Start: 2019-03-31 | End: 2019-03-31

## 2019-03-31 RX ORDER — PREDNISONE 20 MG/1
20 TABLET ORAL DAILY
Qty: 5 TAB | Refills: 0 | Status: SHIPPED | OUTPATIENT
Start: 2019-03-31 | End: 2019-04-05

## 2019-03-31 RX ORDER — LACTOBACILLUS RHAMNOSUS GG 10B CELL
1 CAPSULE ORAL 2 TIMES DAILY
Qty: 30 CAP | Refills: 0 | Status: SHIPPED | OUTPATIENT
Start: 2019-03-31 | End: 2019-03-31

## 2019-03-31 RX ORDER — LEVALBUTEROL TARTRATE 45 UG/1
1-2 AEROSOL, METERED ORAL EVERY 4 HOURS PRN
Qty: 1 INHALER | Refills: 1 | Status: SHIPPED | OUTPATIENT
Start: 2019-03-31

## 2019-03-31 RX ORDER — LEVALBUTEROL TARTRATE 45 UG/1
1-2 AEROSOL, METERED ORAL EVERY 4 HOURS PRN
Qty: 1 INHALER | Refills: 1 | Status: SHIPPED | OUTPATIENT
Start: 2019-03-31 | End: 2019-03-31

## 2019-03-31 RX ORDER — LACTOBACILLUS RHAMNOSUS GG 10B CELL
1 CAPSULE ORAL 2 TIMES DAILY
Qty: 30 CAP | Refills: 0 | Status: SHIPPED | OUTPATIENT
Start: 2019-03-31

## 2019-03-31 RX ORDER — CEFDINIR 300 MG/1
300 CAPSULE ORAL 2 TIMES DAILY
Qty: 10 CAP | Refills: 0 | Status: SHIPPED | OUTPATIENT
Start: 2019-03-31 | End: 2019-04-05

## 2019-03-31 RX ADMIN — BUDESONIDE 0.5 MG: 0.5 SUSPENSION RESPIRATORY (INHALATION) at 07:21

## 2019-03-31 RX ADMIN — LEVALBUTEROL HYDROCHLORIDE 0.63 MG: 0.63 SOLUTION RESPIRATORY (INHALATION) at 07:22

## 2019-03-31 RX ADMIN — LEVOTHYROXINE SODIUM 75 MCG: 75 TABLET ORAL at 05:38

## 2019-03-31 RX ADMIN — BRIMONIDINE TARTRATE 1 DROP: 1.5 SOLUTION/ DROPS OPHTHALMIC at 05:39

## 2019-03-31 RX ADMIN — Medication 1 CAPSULE: at 05:38

## 2019-03-31 NOTE — CARE PLAN
Problem: Discharge Barriers/Planning  Goal: Patient's continuum of care needs will be met    Intervention: Involve patient and significant other/support system in setting and prioritizing goals for hospital stay and discharge  Reviewed discharge instructions with pt and family.  All questions answered  Intervention: Collaborate with Transitional Care Team and Interdisciplinary Team to meet discharge needs  Discussed discharge with MD and advocated for pt's intense wish to discharge

## 2019-03-31 NOTE — PROGRESS NOTES
Pt alert and oriented x4. Complains of headache. Given PRN medication for headache. Safety precautions in placed. Bed in lowest position. Upper siderails up. Treaded socks on. Reinforced the use of call light when needing assistance.

## 2019-03-31 NOTE — CARE PLAN
Problem: Safety  Goal: Will remain free from falls  Outcome: PROGRESSING AS EXPECTED  Reinforced the use of call light when needing assistance. Treaded socks on. Bed on lowest position. Personal belongings within reach. Clutter free environment provided.     Problem: Respiratory:  Goal: Respiratory status will improve  Outcome: PROGRESSING AS EXPECTED  Encouraged to do deep breathing techniques.

## 2019-03-31 NOTE — ASSESSMENT & PLAN NOTE
Coarse central breath sounds.   Add xopenex and pulmicort.   The patient has remote history tobacco use.   Continue to monitor respiratory function.

## 2019-03-31 NOTE — DISCHARGE INSTRUCTIONS
Discharge Instructions    Discharged to home by car with relative. Discharged via wheelchair, hospital escort: Yes.  Special equipment needed: Not Applicable    Be sure to schedule a follow-up appointment with your primary care doctor or any specialists as instructed.     Discharge Plan:   Diet Plan: Discussed (regular)  Activity Level: Discussed (as tolerated)  Confirmed Follow up Appointment: Patient to Call and Schedule Appointment (pt visiting from [place]; leaving tomorrow )  Confirmed Symptoms Management: Discussed  Medication Reconciliation Updated: Yes  Influenza Vaccine Indication: Not indicated: Previously immunized this influenza season and > 8 years of age    I understand that a diet low in cholesterol, fat, and sodium is recommended for good health. Unless I have been given specific instructions below for another diet, I accept this instruction as my diet prescription.   Other diet: regular    Special Instructions: None    · Is patient discharged on Warfarin / Coumadin?   No     Depression / Suicide Risk    As you are discharged from this [facility] facility, it is important to learn how to keep safe from harming yourself.    Recognize the warning signs:  · Abrupt changes in personality, positive or negative- including increase in energy   · Giving away possessions  · Change in eating patterns- significant weight changes-  positive or negative  · Change in sleeping patterns- unable to sleep or sleeping all the time   · Unwillingness or inability to communicate  · Depression  · Unusual sadness, discouragement and loneliness  · Talk of wanting to die  · Neglect of personal appearance   · Rebelliousness- reckless behavior  · Withdrawal from people/activities they love  · Confusion- inability to concentrate     If you or a loved one observes any of these behaviors or has concerns about self-harm, here's what you can do:  · Talk about it- your feelings and reasons for harming yourself  · Remove any means  that you might use to hurt yourself (examples: pills, rope, extension cords, firearm)  · Get professional help from the community (Mental Health, Substance Abuse, psychological counseling)  · Do not be alone:Call your Safe Contact- someone whom you trust who will be there for you.  · Call your local CRISIS HOTLINE 262-4997 or 388-780-3458  · Call your local Children's Mobile Crisis Response Team [place] (779) 903-2177 or wwwThe Grounds Keeper  · Call the toll free National Suicide Prevention Hotlines   · National Suicide Prevention Lifeline 508-934-THAU (4783)  · National Hope Line Network 800-SUICIDE (396-7936)        Urinary Tract Infection, Adult  A urinary tract infection (UTI) is an infection of any part of the urinary tract, which includes the kidneys, ureters, bladder, and urethra. These organs make, store, and get rid of urine in the body. UTI can be a bladder infection (cystitis) or kidney infection (pyelonephritis).  What are the causes?  This infection may be caused by fungi, viruses, or bacteria. Bacteria are the most common cause of UTIs. This condition can also be caused by repeated incomplete emptying of the bladder during urination.  What increases the risk?  This condition is more likely to develop if:  · You ignore your need to urinate or hold urine for long periods of time.  · You do not empty your bladder completely during urination.  · You wipe back to front after urinating or having a bowel movement, if you are female.  · You are uncircumcised, if you are male.  · You are constipated.  · You have a urinary catheter that stays in place (indwelling).  · You have a weak defense (immune) system.  · You have a medical condition that affects your bowels, kidneys, or bladder.  · You have diabetes.  · You take antibiotic medicines frequently or for long periods of time, and the antibiotics no longer work well against certain types of infections (antibiotic resistance).  · You take medicines that  irritate your urinary tract.  · You are exposed to chemicals that irritate your urinary tract.  · You are female.  What are the signs or symptoms?  Symptoms of this condition include:  · Fever.  · Frequent urination or passing small amounts of urine frequently.  · Needing to urinate urgently.  · Pain or burning with urination.  · Urine that smells bad or unusual.  · Cloudy urine.  · Pain in the lower abdomen or back.  · Trouble urinating.  · Blood in the urine.  · Vomiting or being less hungry than normal.  · Diarrhea or abdominal pain.  · Vaginal discharge, if you are female.  How is this diagnosed?  This condition is diagnosed with a medical history and physical exam. You will also need to provide a urine sample to test your urine. Other tests may be done, including:  · Blood tests.  · Sexually transmitted disease (STD) testing.  If you have had more than one UTI, a cystoscopy or imaging studies may be done to determine the cause of the infections.  How is this treated?  Treatment for this condition often includes a combination of two or more of the following:  · Antibiotic medicine.  · Other medicines to treat less common causes of UTI.  · Over-the-counter medicines to treat pain.  · Drinking enough water to stay hydrated.  Follow these instructions at home:  · Take over-the-counter and prescription medicines only as told by your health care provider.  · If you were prescribed an antibiotic, take it as told by your health care provider. Do not stop taking the antibiotic even if you start to feel better.  · Avoid alcohol, caffeine, tea, and carbonated beverages. They can irritate your bladder.  · Drink enough fluid to keep your urine clear or pale yellow.  · Keep all follow-up visits as told by your health care provider. This is important.  · Make sure to:  ¨ Empty your bladder often and completely. Do not hold urine for long periods of time.  ¨ Empty your bladder before and after sex.  ¨ Wipe from front to back  after a bowel movement if you are female. Use each tissue one time when you wipe.  Contact a health care provider if:  · You have back pain.  · You have a fever.  · You feel nauseous or vomit.  · Your symptoms do not get better after 3 days.  · Your symptoms go away and then return.  Get help right away if:  · You have severe back pain or lower abdominal pain.  · You are vomiting and cannot keep down any medicines or water.  This information is not intended to replace advice given to you by your health care provider. Make sure you discuss any questions you have with your health care provider.  Document Released: 09/27/2006 Document Revised: 05/31/2017 Document Reviewed: 11/07/2016  [email or web address] Interactive Patient Education © 2017 [employer]

## 2019-03-31 NOTE — PROGRESS NOTES
Assumed care of patient at change of shift.  During change of shift report, patient (pt) resting in bed, on room air.  During assessment, pt A&Ox4, no complaints of pain nor chills.  MD called to update on pt's request for discharge, and flight back to home, [place], tomorrow.    Discharge order received.  Discharging pt per physician order.  Discharged with family, via wheelchair.  Demonstrated understanding of discharge instructions, need to follow up with PCP within one week, home medications, prescriptions, and nursing care instructions for s/s of worsening infection.  Ambulating without assistance, pain well controlled, tolerating oral medications, oxygen saturation >90%, and tolerating diet.  Educational handouts on UTI given and discussed.  Verbalized understanding of discharge instructions and educational handouts.  All questions answered, belongings with patient at the time of discharge.

## 2019-04-02 LAB
BACTERIA BLD CULT: NORMAL
BACTERIA BLD CULT: NORMAL
SIGNIFICANT IND 70042: NORMAL
SIGNIFICANT IND 70042: NORMAL
SITE SITE: NORMAL
SITE SITE: NORMAL
SOURCE SOURCE: NORMAL
SOURCE SOURCE: NORMAL

## 2019-04-16 NOTE — DISCHARGE SUMMARY
Discharge Summary    CHIEF COMPLAINT ON ADMISSION  Chief Complaint   Patient presents with   • Painful Urination     x3 days    • Flu Like Symptoms       Reason for Admission  cold symptoms     Admission Date  3/28/2019    CODE STATUS  Prior    HPI & HOSPITAL COURSE  This is a 84 years old female with no significant past medical history comes in with dysuria, urgency, body aches, and chills.  Patient is visiting from [place] for skiing.  She has a history of UTI with ESBL bacteria that was treated with Macrobid in the past.  In the ER she met the sepsis criteria with no endorgan damage.  Felt to be secondary to urinary tract infection.  Influenza screen was negative.  Was started on broad-spectrum IV antibiotics.  Urine was sent for culture which grew E. Coli which is sensitive to multiple antibiotics.    The patient's condition steadily improve and her chill and painful urination resolved.       Therefore, she is discharged in good and stable condition to home with close outpatient follow-up.    The patient met 2-midnight criteria for an inpatient stay at the time of discharge.    Discharge Date  3/31/2019    FOLLOW UP ITEMS POST DISCHARGE  None.    DISCHARGE DIAGNOSES  Principal Problem:    UTI (urinary tract infection) POA: Unknown  Active Problems:    Hypothyroidism POA: Unknown    Macrocytic anemia POA: Unknown    Asthma POA: Unknown  Resolved Problems:    Sepsis (HCC) POA: Unknown    Hyponatremia POA: Unknown      FOLLOW UP  No future appointments.  follow up with your primary care provider within one week            MEDICATIONS ON DISCHARGE     Medication List      START taking these medications      Instructions   lactobacillus rhamnosus Caps capsule   Take 1 Cap by mouth 2 times a day.  Dose:  1 Cap        CONTINUE taking these medications      Instructions   ALPHAGAN P 0.1 % Soln  Generic drug:  brimonidine   Place 1 Drop in both eyes 2 Times a Day.  Dose:  1 Drop     CALCIUM PO   Take 2 Tabs by mouth  every morning.  Dose:  2 Tab     ESTRACE 0.5 MG tablet  Generic drug:  estradiol   Take 0.25 mg by mouth every day.  Dose:  0.25 mg     FLAX SEED OIL PO   Take 1 Tab by mouth every morning.  Dose:  1 Tab     levalbuterol 45 MCG/ACT inhaler  Commonly known as:  XOPENEX HFA   Doctor's comments:  Asthma with acute exacerbation.  J45.901  Inhale 1-2 Puffs by mouth every four hours as needed for Shortness of Breath.  Dose:  1-2 Puff     levothyroxine 75 MCG Tabs  Commonly known as:  SYNTHROID   Take 75 mcg by mouth every morning.  Dose:  75 mcg     MAGNESIUM PO   Take 1 Tab by mouth every morning.  Dose:  1 Tab     VITAMIN B 12 PO   Take 1 Tab by mouth every morning.  Dose:  1 Tab        ASK your doctor about these medications      Instructions   cefdinir 300 MG Caps  Commonly known as:  OMNICEF  Ask about: Should I take this medication?   Take 1 Cap by mouth 2 times a day for 5 days.  Dose:  300 mg     predniSONE 20 MG Tabs  Commonly known as:  DELTASONE  Ask about: Should I take this medication?   Take 1 Tab by mouth every day for 5 days.  Dose:  20 mg            Allergies  Allergies   Allergen Reactions   • Asa [Aspirin] Hives   • Ciprofloxacin Unspecified     Asthma         DIET  Regular Diet.    ACTIVITY  As tolerated.      CONSULTATIONS  None.    PROCEDURES  None    LABORATORY  Lab Results   Component Value Date    SODIUM 134 (L) 03/31/2019    POTASSIUM 3.6 03/31/2019    CHLORIDE 103 03/31/2019    CO2 25 03/31/2019    GLUCOSE 106 (H) 03/31/2019    BUN 8 03/31/2019    CREATININE 0.64 03/31/2019        Lab Results   Component Value Date    WBC 12.3 (H) 03/31/2019    HEMOGLOBIN 11.1 (L) 03/31/2019    HEMATOCRIT 34.0 (L) 03/31/2019    PLATELETCT 238 03/31/2019      Physical Exam   Constitutional: She is oriented to person, place, and time. No distress.   HENT:   Head: Normocephalic and atraumatic.   Mouth/Throat: Oropharynx is clear and moist. No oropharyngeal exudate.   Eyes: Pupils are equal, round, and reactive to  light. Right eye exhibits no discharge. Left eye exhibits no discharge. No scleral icterus.   Neck: Normal range of motion. No tracheal deviation present. No thyromegaly present.   Cardiovascular: Normal rate and regular rhythm.  Exam reveals no gallop and no friction rub.    Pulmonary/Chest: Effort normal. No respiratory distress. She has no wheezes.   Coarse bronchial breathsounds.   Abdominal: Soft. She exhibits no distension. There is no tenderness. There is no CVA tenderness.   Musculoskeletal: Able to move all four extremities. She exhibits no tenderness or deformity.   Neurological: She is alert and oriented to person, place, and time. No cranial nerve deficit.   Skin: Skin is warm and dry. She is not diaphoretic.   Psychiatric: She has a normal mood and affect.     Total time of the discharge process exceeds 32 minutes.

## 2023-10-02 ENCOUNTER — APPOINTMENT (RX ONLY)
Dept: URBAN - METROPOLITAN AREA CLINIC 40 | Facility: CLINIC | Age: 88
Setting detail: DERMATOLOGY
End: 2023-10-02

## 2023-10-02 DIAGNOSIS — L82.1 OTHER SEBORRHEIC KERATOSIS: ICD-10-CM

## 2023-10-02 DIAGNOSIS — D485 NEOPLASM OF UNCERTAIN BEHAVIOR OF SKIN: ICD-10-CM

## 2023-10-02 DIAGNOSIS — Z71.89 OTHER SPECIFIED COUNSELING: ICD-10-CM

## 2023-10-02 DIAGNOSIS — D22 MELANOCYTIC NEVI: ICD-10-CM

## 2023-10-02 DIAGNOSIS — D18.0 HEMANGIOMA: ICD-10-CM

## 2023-10-02 DIAGNOSIS — L57.0 ACTINIC KERATOSIS: ICD-10-CM

## 2023-10-02 PROBLEM — D18.01 HEMANGIOMA OF SKIN AND SUBCUTANEOUS TISSUE: Status: ACTIVE | Noted: 2023-10-02

## 2023-10-02 PROBLEM — D48.5 NEOPLASM OF UNCERTAIN BEHAVIOR OF SKIN: Status: ACTIVE | Noted: 2023-10-02

## 2023-10-02 PROBLEM — D22.62 MELANOCYTIC NEVI OF LEFT UPPER LIMB, INCLUDING SHOULDER: Status: ACTIVE | Noted: 2023-10-02

## 2023-10-02 PROBLEM — D22.61 MELANOCYTIC NEVI OF RIGHT UPPER LIMB, INCLUDING SHOULDER: Status: ACTIVE | Noted: 2023-10-02

## 2023-10-02 PROCEDURE — ? BIOPSY BY SHAVE METHOD

## 2023-10-02 PROCEDURE — ? LOCATION MARKER (SIMPLE)

## 2023-10-02 PROCEDURE — 17000 DESTRUCT PREMALG LESION: CPT | Mod: 59

## 2023-10-02 PROCEDURE — 99203 OFFICE O/P NEW LOW 30 MIN: CPT | Mod: 25

## 2023-10-02 PROCEDURE — ? SUNSCREEN RECOMMENDATIONS

## 2023-10-02 PROCEDURE — 11103 TANGNTL BX SKIN EA SEP/ADDL: CPT

## 2023-10-02 PROCEDURE — 11102 TANGNTL BX SKIN SINGLE LES: CPT

## 2023-10-02 PROCEDURE — ? COUNSELING

## 2023-10-02 PROCEDURE — 17003 DESTRUCT PREMALG LES 2-14: CPT

## 2023-10-02 PROCEDURE — ? LIQUID NITROGEN

## 2023-10-02 ASSESSMENT — LOCATION SIMPLE DESCRIPTION DERM
LOCATION SIMPLE: RIGHT UPPER BACK
LOCATION SIMPLE: LEFT FOREARM
LOCATION SIMPLE: RIGHT EYEBROW
LOCATION SIMPLE: CHEST
LOCATION SIMPLE: RIGHT CHEEK
LOCATION SIMPLE: NOSE
LOCATION SIMPLE: LEFT POSTERIOR UPPER ARM
LOCATION SIMPLE: ABDOMEN
LOCATION SIMPLE: LEFT CHEEK
LOCATION SIMPLE: LEFT SCALP
LOCATION SIMPLE: RIGHT FOREARM

## 2023-10-02 ASSESSMENT — LOCATION DETAILED DESCRIPTION DERM
LOCATION DETAILED: RIGHT MEDIAL EYEBROW
LOCATION DETAILED: RIGHT MID-UPPER BACK
LOCATION DETAILED: LEFT DISTAL DORSAL FOREARM
LOCATION DETAILED: RIGHT PROXIMAL DORSAL FOREARM
LOCATION DETAILED: RIGHT DISTAL DORSAL FOREARM
LOCATION DETAILED: LEFT DISTAL POSTERIOR UPPER ARM
LOCATION DETAILED: LEFT MEDIAL MALAR CHEEK
LOCATION DETAILED: NASAL DORSUM
LOCATION DETAILED: LEFT MEDIAL FRONTAL SCALP
LOCATION DETAILED: EPIGASTRIC SKIN
LOCATION DETAILED: RIGHT SUPERIOR LATERAL MALAR CHEEK
LOCATION DETAILED: LEFT VENTRAL PROXIMAL FOREARM
LOCATION DETAILED: RIGHT LATERAL SUPERIOR CHEST
LOCATION DETAILED: RIGHT INFERIOR MEDIAL MALAR CHEEK

## 2023-10-02 ASSESSMENT — LOCATION ZONE DERM
LOCATION ZONE: FACE
LOCATION ZONE: NOSE
LOCATION ZONE: ARM
LOCATION ZONE: SCALP
LOCATION ZONE: TRUNK

## 2023-10-02 NOTE — PROCEDURE: LIQUID NITROGEN
Total Number Of Aks Treated: 4
Render In Bullet Format When Appropriate: No
Duration Of Freeze Thaw-Cycle (Seconds): 5
Detail Level: Simple
Post-Care Instructions: I reviewed with the patient in detail post-care instructions. Patient is to wear sunprotection, and avoid picking at any of the treated lesions. Pt may apply Vaseline to crusted or scabbing areas.
Number Of Freeze-Thaw Cycles: 3 freeze-thaw cycles
Consent: The patient's consent was obtained including but not limited to risks of crusting, scabbing, blistering, scarring, darker or lighter pigmentary change, recurrence, incomplete removal and infection.

## 2023-10-02 NOTE — PROCEDURE: BIOPSY BY SHAVE METHOD
Body Location Override (Optional - Billing Will Still Be Based On Selected Body Map Location If Applicable): left nasal bridge
Detail Level: Detailed
Depth Of Biopsy: dermis
Was A Bandage Applied: Yes
Size Of Lesion In Cm: 0.4
X Size Of Lesion In Cm: 0
Biopsy Type: H and E
Biopsy Method: Dermablade
Anesthesia Type: 1% lidocaine with epinephrine
Anesthesia Volume In Cc: 0.5
Hemostasis: Electrocautery
Wound Care: Bacitracin
Dressing: bandage
Destruction After The Procedure: No
Type Of Destruction Used: Curettage
Curettage Text: The wound bed was treated with curettage after the biopsy was performed.
Cryotherapy Text: The wound bed was treated with cryotherapy after the biopsy was performed.
Electrodesiccation Text: The wound bed was treated with electrodesiccation after the biopsy was performed.
Electrodesiccation And Curettage Text: The wound bed was treated with electrodesiccation and curettage after the biopsy was performed.
Silver Nitrate Text: The wound bed was treated with silver nitrate after the biopsy was performed.
Lab: [id number]
Lab Facility: [id number]
Path Notes (To The Dermatopathologist): R/O: DN VS SCC \\nSize: 0.4 cm
Consent: Written consent was obtained and risks were reviewed including but not limited to scarring, infection, bleeding, scabbing, incomplete removal, nerve damage and allergy to anesthesia.
Post-Care Instructions: I reviewed with the patient in detail post-care instructions. Patient is to keep the biopsy site dry overnight, and then apply bacitracin twice daily until healed. Patient may apply hydrogen peroxide soaks to remove any crusting.
Notification Instructions: Patient will be notified of biopsy results. However, patient instructed to call the office if not contacted within 2 weeks.
Billing Type: Third-Party Bill
Information: Selecting Yes will display possible errors in your note based on the variables you have selected. This validation is only offered as a suggestion for you. PLEASE NOTE THAT THE VALIDATION TEXT WILL BE REMOVED WHEN YOU FINALIZE YOUR NOTE. IF YOU WANT TO FAX A PRELIMINARY NOTE YOU WILL NEED TO TOGGLE THIS TO 'NO' IF YOU DO NOT WANT IT IN YOUR FAXED NOTE.
Body Location Override (Optional - Billing Will Still Be Based On Selected Body Map Location If Applicable): right preauricular
Size Of Lesion In Cm: 0.8
Path Notes (To The Dermatopathologist): R/O: LM VS LENTIGO \\nSize: 0.8  cm

## 2023-10-02 NOTE — PROCEDURE: MIPS QUALITY
Quality 111:Pneumonia Vaccination Status For Older Adults: Pneumococcal vaccine (PPSV23) administered on or after patient’s 60th birthday and before the end of the measurement period
Quality 431: Preventive Care And Screening: Unhealthy Alcohol Use - Screening: Patient not identified as an unhealthy alcohol user when screened for unhealthy alcohol use using a systematic screening method
Quality 130: Documentation Of Current Medications In The Medical Record: Current Medications Documented
Detail Level: Detailed
Quality 47: Advance Care Plan: Advance care planning not documented, reason not otherwise specified.
Quality 226: Preventive Care And Screening: Tobacco Use: Screening And Cessation Intervention: Patient screened for tobacco use and is an ex/non-smoker
Quality 110: Preventive Care And Screening: Influenza Immunization: Influenza immunization was not ordered or administered, reason not given

## 2023-11-01 ENCOUNTER — APPOINTMENT (RX ONLY)
Dept: URBAN - METROPOLITAN AREA CLINIC 40 | Facility: CLINIC | Age: 88
Setting detail: DERMATOLOGY
End: 2023-11-01

## 2023-11-01 DIAGNOSIS — L81.4 OTHER MELANIN HYPERPIGMENTATION: ICD-10-CM

## 2023-11-01 DIAGNOSIS — Z71.89 OTHER SPECIFIED COUNSELING: ICD-10-CM

## 2023-11-01 PROCEDURE — ? PATHOLOGY DISCUSSION

## 2023-11-01 PROCEDURE — ? COUNSELING

## 2023-11-01 PROCEDURE — ? SUNSCREEN RECOMMENDATIONS

## 2023-11-01 PROCEDURE — 99212 OFFICE O/P EST SF 10 MIN: CPT

## 2023-12-21 ENCOUNTER — APPOINTMENT (RX ONLY)
Dept: URBAN - METROPOLITAN AREA CLINIC 40 | Facility: CLINIC | Age: 88
Setting detail: DERMATOLOGY
End: 2023-12-21

## 2023-12-21 DIAGNOSIS — D18.0 HEMANGIOMA: ICD-10-CM

## 2023-12-21 DIAGNOSIS — L81.4 OTHER MELANIN HYPERPIGMENTATION: ICD-10-CM

## 2023-12-21 DIAGNOSIS — D485 NEOPLASM OF UNCERTAIN BEHAVIOR OF SKIN: ICD-10-CM

## 2023-12-21 DIAGNOSIS — Z71.89 OTHER SPECIFIED COUNSELING: ICD-10-CM

## 2023-12-21 PROBLEM — D18.01 HEMANGIOMA OF SKIN AND SUBCUTANEOUS TISSUE: Status: ACTIVE | Noted: 2023-12-21

## 2023-12-21 PROBLEM — D48.5 NEOPLASM OF UNCERTAIN BEHAVIOR OF SKIN: Status: ACTIVE | Noted: 2023-12-21

## 2023-12-21 PROCEDURE — 11102 TANGNTL BX SKIN SINGLE LES: CPT

## 2023-12-21 PROCEDURE — ? COUNSELING

## 2023-12-21 PROCEDURE — ? BIOPSY BY SHAVE METHOD

## 2023-12-21 PROCEDURE — ? LOCATION MARKER (SIMPLE)

## 2023-12-21 PROCEDURE — 99213 OFFICE O/P EST LOW 20 MIN: CPT | Mod: 25

## 2023-12-21 PROCEDURE — ? SUNSCREEN RECOMMENDATIONS

## 2023-12-21 ASSESSMENT — LOCATION DETAILED DESCRIPTION DERM
LOCATION DETAILED: POSTERIOR MID-PARIETAL SCALP
LOCATION DETAILED: RIGHT SUPERIOR PARIETAL SCALP
LOCATION DETAILED: RIGHT CENTRAL FRONTAL SCALP
LOCATION DETAILED: LEFT MEDIAL FRONTAL SCALP

## 2023-12-21 ASSESSMENT — LOCATION SIMPLE DESCRIPTION DERM
LOCATION SIMPLE: RIGHT SCALP
LOCATION SIMPLE: LEFT SCALP
LOCATION SIMPLE: SCALP
LOCATION SIMPLE: POSTERIOR SCALP

## 2023-12-21 ASSESSMENT — LOCATION ZONE DERM: LOCATION ZONE: SCALP

## 2023-12-21 NOTE — PROCEDURE: BIOPSY BY SHAVE METHOD
Body Location Override (Optional - Billing Will Still Be Based On Selected Body Map Location If Applicable): mid posterior parietal scalp
Detail Level: Detailed
Depth Of Biopsy: dermis
Was A Bandage Applied: Yes
Size Of Lesion In Cm: 0.8
X Size Of Lesion In Cm: 0
Biopsy Type: H and E
Biopsy Method: Dermablade
Anesthesia Type: 1% lidocaine with epinephrine
Anesthesia Volume In Cc: 0.5
Hemostasis: Electrocautery
Wound Care: Bacitracin
Dressing: bandage
Destruction After The Procedure: No
Type Of Destruction Used: Curettage
Curettage Text: The wound bed was treated with curettage after the biopsy was performed.
Cryotherapy Text: The wound bed was treated with cryotherapy after the biopsy was performed.
Electrodesiccation Text: The wound bed was treated with electrodesiccation after the biopsy was performed.
Electrodesiccation And Curettage Text: The wound bed was treated with electrodesiccation and curettage after the biopsy was performed.
Silver Nitrate Text: The wound bed was treated with silver nitrate after the biopsy was performed.
Lab: [id number]
Lab Facility: [id number]
Path Notes (To The Dermatopathologist): R/O: BCC vs SCC\\nSize: 0.8 cm
Consent: Written consent was obtained and risks were reviewed including but not limited to scarring, infection, bleeding, scabbing, incomplete removal, nerve damage and allergy to anesthesia.
Post-Care Instructions: I reviewed with the patient in detail post-care instructions. Patient is to keep the biopsy site dry overnight, and then apply bacitracin twice daily until healed. Patient may apply hydrogen peroxide soaks to remove any crusting.
Notification Instructions: Patient will be notified of biopsy results. However, patient instructed to call the office if not contacted within 2 weeks.
Billing Type: Third-Party Bill
Information: Selecting Yes will display possible errors in your note based on the variables you have selected. This validation is only offered as a suggestion for you. PLEASE NOTE THAT THE VALIDATION TEXT WILL BE REMOVED WHEN YOU FINALIZE YOUR NOTE. IF YOU WANT TO FAX A PRELIMINARY NOTE YOU WILL NEED TO TOGGLE THIS TO 'NO' IF YOU DO NOT WANT IT IN YOUR FAXED NOTE.

## 2024-01-04 ENCOUNTER — APPOINTMENT (RX ONLY)
Dept: URBAN - METROPOLITAN AREA CLINIC 40 | Facility: CLINIC | Age: 89
Setting detail: DERMATOLOGY
End: 2024-01-04

## 2024-01-04 DIAGNOSIS — L82.1 OTHER SEBORRHEIC KERATOSIS: ICD-10-CM

## 2024-01-04 DIAGNOSIS — L82.0 INFLAMED SEBORRHEIC KERATOSIS: ICD-10-CM

## 2024-01-04 DIAGNOSIS — D22 MELANOCYTIC NEVI: ICD-10-CM

## 2024-01-04 DIAGNOSIS — D18.0 HEMANGIOMA: ICD-10-CM

## 2024-01-04 DIAGNOSIS — Z71.89 OTHER SPECIFIED COUNSELING: ICD-10-CM

## 2024-01-04 DIAGNOSIS — L57.0 ACTINIC KERATOSIS: ICD-10-CM

## 2024-01-04 PROBLEM — D18.01 HEMANGIOMA OF SKIN AND SUBCUTANEOUS TISSUE: Status: ACTIVE | Noted: 2024-01-04

## 2024-01-04 PROBLEM — D22.9 MELANOCYTIC NEVI, UNSPECIFIED: Status: ACTIVE | Noted: 2024-01-04

## 2024-01-04 PROCEDURE — ? COUNSELING

## 2024-01-04 PROCEDURE — ? SUNSCREEN RECOMMENDATIONS

## 2024-01-04 PROCEDURE — 17110 DESTRUCTION B9 LES UP TO 14: CPT

## 2024-01-04 PROCEDURE — ? LIQUID NITROGEN

## 2024-01-04 PROCEDURE — 17000 DESTRUCT PREMALG LESION: CPT | Mod: 59

## 2024-01-04 PROCEDURE — 99213 OFFICE O/P EST LOW 20 MIN: CPT | Mod: 25

## 2024-01-04 PROCEDURE — ? TREATMENT REGIMEN

## 2024-01-04 ASSESSMENT — LOCATION DETAILED DESCRIPTION DERM
LOCATION DETAILED: MID-FRONTAL SCALP
LOCATION DETAILED: RIGHT MEDIAL FRONTAL SCALP

## 2024-01-04 ASSESSMENT — LOCATION ZONE DERM: LOCATION ZONE: SCALP

## 2024-01-04 ASSESSMENT — LOCATION SIMPLE DESCRIPTION DERM
LOCATION SIMPLE: RIGHT SCALP
LOCATION SIMPLE: ANTERIOR SCALP

## 2024-04-10 ENCOUNTER — APPOINTMENT (RX ONLY)
Dept: URBAN - METROPOLITAN AREA CLINIC 40 | Facility: CLINIC | Age: 89
Setting detail: DERMATOLOGY
End: 2024-04-10

## 2024-04-10 DIAGNOSIS — Z71.89 OTHER SPECIFIED COUNSELING: ICD-10-CM

## 2024-04-10 DIAGNOSIS — L82.0 INFLAMED SEBORRHEIC KERATOSIS: ICD-10-CM

## 2024-04-10 DIAGNOSIS — I87.2 VENOUS INSUFFICIENCY (CHRONIC) (PERIPHERAL): ICD-10-CM

## 2024-04-10 PROCEDURE — 17110 DESTRUCTION B9 LES UP TO 14: CPT

## 2024-04-10 PROCEDURE — ? LIQUID NITROGEN

## 2024-04-10 PROCEDURE — ? TREATMENT REGIMEN

## 2024-04-10 PROCEDURE — ? SUNSCREEN RECOMMENDATIONS

## 2024-04-10 PROCEDURE — ? COUNSELING

## 2024-04-10 PROCEDURE — 99213 OFFICE O/P EST LOW 20 MIN: CPT | Mod: 25

## 2024-04-10 PROCEDURE — ? LOCATION MARKER (SIMPLE)

## 2024-04-10 ASSESSMENT — LOCATION ZONE DERM
LOCATION ZONE: LEG
LOCATION ZONE: SCALP

## 2024-04-10 ASSESSMENT — LOCATION DETAILED DESCRIPTION DERM
LOCATION DETAILED: RIGHT DISTAL PRETIBIAL REGION
LOCATION DETAILED: MID-FRONTAL SCALP

## 2024-04-10 ASSESSMENT — LOCATION SIMPLE DESCRIPTION DERM
LOCATION SIMPLE: RIGHT PRETIBIAL REGION
LOCATION SIMPLE: ANTERIOR SCALP

## 2024-04-10 NOTE — PROCEDURE: TREATMENT REGIMEN
Plan: Will plan to treat with destruction for the next few visits or until lesions have resolved.
Detail Level: Detailed
Detail Level: Zone
Otc Regimen: Cortisone

## 2024-04-10 NOTE — PROCEDURE: LIQUID NITROGEN
Spray Paint Technique: No
Number Of Freeze-Thaw Cycles: 3 freeze-thaw cycles
Post-Care Instructions: I reviewed with the patient in detail post-care instructions. Patient is to wear sunprotection, and avoid picking at any of the treated lesions. Pt may apply Vaseline to crusted or scabbing areas.
Show Spray Paint Technique Variable?: Yes
Total Number Of Lesions Treated: 1
Duration Of Freeze Thaw-Cycle (Seconds): 5
Consent: The patient's consent was obtained including but not limited to risks of crusting, scabbing, blistering, scarring, darker or lighter pigmentary change, recurrence, incomplete removal and infection.
Detail Level: Simple
Pared With?: curette
Spray Paint Text: The liquid nitrogen was applied to the skin utilizing a spray paint frosting technique.
Medical Necessity Clause: This procedure was medically necessary because the lesions that were treated were:
Medical Necessity Information: It is in your best interest to select a reason for this procedure from the list below. All of these items fulfill various CMS LCD requirements except the new and changing color options.